# Patient Record
Sex: MALE | Race: WHITE | Employment: OTHER | URBAN - METROPOLITAN AREA
[De-identification: names, ages, dates, MRNs, and addresses within clinical notes are randomized per-mention and may not be internally consistent; named-entity substitution may affect disease eponyms.]

---

## 2017-09-12 PROBLEM — R53.81 MALAISE AND FATIGUE: Status: ACTIVE | Noted: 2017-09-12

## 2017-09-12 PROBLEM — R19.7 DIARRHEA, UNSPECIFIED TYPE: Status: ACTIVE | Noted: 2017-09-12

## 2017-09-12 PROBLEM — R53.83 MALAISE AND FATIGUE: Status: ACTIVE | Noted: 2017-09-12

## 2017-09-12 PROBLEM — R03.0 ELEVATED BLOOD PRESSURE READING IN OFFICE WITHOUT DIAGNOSIS OF HYPERTENSION: Status: ACTIVE | Noted: 2017-09-12

## 2018-01-11 PROBLEM — R53.81 MALAISE AND FATIGUE: Status: RESOLVED | Noted: 2017-09-12 | Resolved: 2018-01-11

## 2018-01-11 PROBLEM — R19.7 DIARRHEA, UNSPECIFIED TYPE: Status: RESOLVED | Noted: 2017-09-12 | Resolved: 2018-01-11

## 2018-01-11 PROBLEM — F41.9 ANXIETY: Status: ACTIVE | Noted: 2018-01-11

## 2018-01-11 PROBLEM — R53.83 MALAISE AND FATIGUE: Status: RESOLVED | Noted: 2017-09-12 | Resolved: 2018-01-11

## 2018-01-11 PROBLEM — R03.0 ELEVATED BLOOD PRESSURE READING IN OFFICE WITHOUT DIAGNOSIS OF HYPERTENSION: Status: RESOLVED | Noted: 2017-09-12 | Resolved: 2018-01-11

## 2018-01-11 PROBLEM — E78.00 PURE HYPERCHOLESTEROLEMIA: Status: ACTIVE | Noted: 2018-01-11

## 2018-01-11 PROBLEM — M10.9 ACUTE GOUT OF FOOT, UNSPECIFIED CAUSE, UNSPECIFIED LATERALITY: Status: ACTIVE | Noted: 2018-01-11

## 2018-01-11 PROBLEM — G47.00 INSOMNIA, UNSPECIFIED TYPE: Status: ACTIVE | Noted: 2018-01-11

## 2018-07-12 PROCEDURE — 88305 TISSUE EXAM BY PATHOLOGIST: CPT | Performed by: SPECIALIST

## 2022-04-07 PROBLEM — I10 ESSENTIAL HYPERTENSION: Status: ACTIVE | Noted: 2022-04-07

## 2022-04-07 PROBLEM — E78.00 PURE HYPERCHOLESTEROLEMIA: Status: RESOLVED | Noted: 2018-01-11 | Resolved: 2022-04-07

## 2022-04-07 PROBLEM — R73.01 IMPAIRED FASTING GLUCOSE: Status: ACTIVE | Noted: 2022-04-07

## 2022-04-07 PROBLEM — E78.2 HYPERLIPEMIA, MIXED: Status: ACTIVE | Noted: 2022-04-07

## 2022-04-07 PROBLEM — E66.9 OBESITY (BMI 30.0-34.9): Status: ACTIVE | Noted: 2022-04-07

## 2023-05-04 RX ORDER — CLINDAMYCIN PHOSPHATE 900 MG/50ML
900 INJECTION INTRAVENOUS ONCE
Status: CANCELLED | OUTPATIENT
Start: 2023-05-23 | End: 2023-05-04

## 2023-05-22 ENCOUNTER — ANESTHESIA EVENT (OUTPATIENT)
Dept: SURGERY | Facility: HOSPITAL | Age: 73
End: 2023-05-22
Payer: MEDICARE

## 2023-05-23 ENCOUNTER — APPOINTMENT (OUTPATIENT)
Dept: GENERAL RADIOLOGY | Facility: HOSPITAL | Age: 73
End: 2023-05-23
Attending: UROLOGY
Payer: MEDICARE

## 2023-05-23 ENCOUNTER — HOSPITAL ENCOUNTER (OUTPATIENT)
Facility: HOSPITAL | Age: 73
Discharge: HOME | End: 2023-05-24
Attending: UROLOGY | Admitting: UROLOGY
Payer: MEDICARE

## 2023-05-23 ENCOUNTER — ANESTHESIA (OUTPATIENT)
Dept: SURGERY | Facility: HOSPITAL | Age: 73
End: 2023-05-23
Payer: MEDICARE

## 2023-05-23 PROBLEM — N13.8 BPH WITH OBSTRUCTION/LOWER URINARY TRACT SYMPTOMS: Status: ACTIVE | Noted: 2023-05-23

## 2023-05-23 PROBLEM — N40.1 BPH WITH OBSTRUCTION/LOWER URINARY TRACT SYMPTOMS: Status: ACTIVE | Noted: 2023-05-23

## 2023-05-23 LAB
ANION GAP SERPL CALC-SCNC: 3 MMOL/L (ref 0–18)
BUN BLD-MCNC: 18 MG/DL (ref 7–18)
CALCIUM BLD-MCNC: 8.3 MG/DL (ref 8.5–10.1)
CHLORIDE SERPL-SCNC: 108 MMOL/L (ref 98–112)
CO2 SERPL-SCNC: 27 MMOL/L (ref 21–32)
CREAT BLD-MCNC: 1.47 MG/DL
ERYTHROCYTE [DISTWIDTH] IN BLOOD BY AUTOMATED COUNT: 12.4 %
GFR SERPLBLD BASED ON 1.73 SQ M-ARVRAT: 51 ML/MIN/1.73M2 (ref 60–?)
GLUCOSE BLD-MCNC: 143 MG/DL (ref 70–99)
HCT VFR BLD AUTO: 41.8 %
HGB BLD-MCNC: 14.3 G/DL
MCH RBC QN AUTO: 29.4 PG (ref 26–34)
MCHC RBC AUTO-ENTMCNC: 34.2 G/DL (ref 31–37)
MCV RBC AUTO: 85.8 FL
OSMOLALITY SERPL CALC.SUM OF ELEC: 290 MOSM/KG (ref 275–295)
PLATELET # BLD AUTO: 162 10(3)UL (ref 150–450)
POTASSIUM SERPL-SCNC: 3.9 MMOL/L (ref 3.5–5.1)
RBC # BLD AUTO: 4.87 X10(6)UL
SODIUM SERPL-SCNC: 138 MMOL/L (ref 136–145)
WBC # BLD AUTO: 6.4 X10(3) UL (ref 4–11)

## 2023-05-23 PROCEDURE — 80048 BASIC METABOLIC PNL TOTAL CA: CPT | Performed by: UROLOGY

## 2023-05-23 PROCEDURE — 88305 TISSUE EXAM BY PATHOLOGIST: CPT | Performed by: UROLOGY

## 2023-05-23 PROCEDURE — 88300 SURGICAL PATH GROSS: CPT | Performed by: UROLOGY

## 2023-05-23 PROCEDURE — 82365 CALCULUS SPECTROSCOPY: CPT | Performed by: UROLOGY

## 2023-05-23 PROCEDURE — 0V507ZZ DESTRUCTION OF PROSTATE, VIA NATURAL OR ARTIFICIAL OPENING: ICD-10-PCS | Performed by: UROLOGY

## 2023-05-23 PROCEDURE — 0TCB8ZZ EXTIRPATION OF MATTER FROM BLADDER, VIA NATURAL OR ARTIFICIAL OPENING ENDOSCOPIC: ICD-10-PCS | Performed by: UROLOGY

## 2023-05-23 PROCEDURE — 85027 COMPLETE CBC AUTOMATED: CPT | Performed by: UROLOGY

## 2023-05-23 RX ORDER — CEFAZOLIN SODIUM/WATER 2 G/20 ML
2 SYRINGE (ML) INTRAVENOUS ONCE
Status: COMPLETED | OUTPATIENT
Start: 2023-05-23 | End: 2023-05-23

## 2023-05-23 RX ORDER — HEPARIN SODIUM 5000 [USP'U]/ML
5000 INJECTION, SOLUTION INTRAVENOUS; SUBCUTANEOUS EVERY 8 HOURS SCHEDULED
Status: DISCONTINUED | OUTPATIENT
Start: 2023-05-23 | End: 2023-05-24

## 2023-05-23 RX ORDER — OXYCODONE HYDROCHLORIDE 5 MG/1
2.5 TABLET ORAL EVERY 4 HOURS PRN
Status: DISCONTINUED | OUTPATIENT
Start: 2023-05-23 | End: 2023-05-24

## 2023-05-23 RX ORDER — SENNOSIDES 8.6 MG
17.2 TABLET ORAL NIGHTLY PRN
Status: DISCONTINUED | OUTPATIENT
Start: 2023-05-23 | End: 2023-05-24

## 2023-05-23 RX ORDER — SODIUM CHLORIDE, SODIUM LACTATE, POTASSIUM CHLORIDE, CALCIUM CHLORIDE 600; 310; 30; 20 MG/100ML; MG/100ML; MG/100ML; MG/100ML
INJECTION, SOLUTION INTRAVENOUS CONTINUOUS
Status: DISCONTINUED | OUTPATIENT
Start: 2023-05-23 | End: 2023-05-23

## 2023-05-23 RX ORDER — NALOXONE HYDROCHLORIDE 0.4 MG/ML
80 INJECTION, SOLUTION INTRAMUSCULAR; INTRAVENOUS; SUBCUTANEOUS AS NEEDED
Status: DISCONTINUED | OUTPATIENT
Start: 2023-05-23 | End: 2023-05-23 | Stop reason: HOSPADM

## 2023-05-23 RX ORDER — HYDROMORPHONE HYDROCHLORIDE 1 MG/ML
0.3 INJECTION, SOLUTION INTRAMUSCULAR; INTRAVENOUS; SUBCUTANEOUS EVERY 5 MIN PRN
Status: DISCONTINUED | OUTPATIENT
Start: 2023-05-23 | End: 2023-05-23 | Stop reason: HOSPADM

## 2023-05-23 RX ORDER — DEXAMETHASONE SODIUM PHOSPHATE 4 MG/ML
VIAL (ML) INJECTION AS NEEDED
Status: DISCONTINUED | OUTPATIENT
Start: 2023-05-23 | End: 2023-05-23 | Stop reason: SURG

## 2023-05-23 RX ORDER — LOSARTAN POTASSIUM 100 MG/1
100 TABLET ORAL NIGHTLY
Status: DISCONTINUED | OUTPATIENT
Start: 2023-05-23 | End: 2023-05-24

## 2023-05-23 RX ORDER — ONDANSETRON 2 MG/ML
INJECTION INTRAMUSCULAR; INTRAVENOUS AS NEEDED
Status: DISCONTINUED | OUTPATIENT
Start: 2023-05-23 | End: 2023-05-23 | Stop reason: SURG

## 2023-05-23 RX ORDER — HYDROMORPHONE HYDROCHLORIDE 1 MG/ML
0.4 INJECTION, SOLUTION INTRAMUSCULAR; INTRAVENOUS; SUBCUTANEOUS EVERY 5 MIN PRN
Status: DISCONTINUED | OUTPATIENT
Start: 2023-05-23 | End: 2023-05-23 | Stop reason: HOSPADM

## 2023-05-23 RX ORDER — PHENYLEPHRINE HCL 10 MG/ML
VIAL (ML) INJECTION AS NEEDED
Status: DISCONTINUED | OUTPATIENT
Start: 2023-05-23 | End: 2023-05-23 | Stop reason: SURG

## 2023-05-23 RX ORDER — HYDROCODONE BITARTRATE AND ACETAMINOPHEN 5; 325 MG/1; MG/1
1 TABLET ORAL ONCE AS NEEDED
Status: DISCONTINUED | OUTPATIENT
Start: 2023-05-23 | End: 2023-05-23 | Stop reason: HOSPADM

## 2023-05-23 RX ORDER — HYDROMORPHONE HYDROCHLORIDE 1 MG/ML
0.2 INJECTION, SOLUTION INTRAMUSCULAR; INTRAVENOUS; SUBCUTANEOUS EVERY 5 MIN PRN
Status: DISCONTINUED | OUTPATIENT
Start: 2023-05-23 | End: 2023-05-23 | Stop reason: HOSPADM

## 2023-05-23 RX ORDER — OXYBUTYNIN CHLORIDE 5 MG/1
5 TABLET ORAL EVERY 6 HOURS PRN
Status: DISCONTINUED | OUTPATIENT
Start: 2023-05-23 | End: 2023-05-24

## 2023-05-23 RX ORDER — ONDANSETRON 2 MG/ML
4 INJECTION INTRAMUSCULAR; INTRAVENOUS EVERY 6 HOURS PRN
Status: DISCONTINUED | OUTPATIENT
Start: 2023-05-23 | End: 2023-05-24

## 2023-05-23 RX ORDER — LABETALOL HYDROCHLORIDE 5 MG/ML
5 INJECTION, SOLUTION INTRAVENOUS EVERY 5 MIN PRN
Status: DISCONTINUED | OUTPATIENT
Start: 2023-05-23 | End: 2023-05-23 | Stop reason: HOSPADM

## 2023-05-23 RX ORDER — ACETAMINOPHEN 500 MG
1000 TABLET ORAL ONCE AS NEEDED
Status: DISCONTINUED | OUTPATIENT
Start: 2023-05-23 | End: 2023-05-23 | Stop reason: HOSPADM

## 2023-05-23 RX ORDER — ONDANSETRON 2 MG/ML
4 INJECTION INTRAMUSCULAR; INTRAVENOUS EVERY 6 HOURS PRN
Status: DISCONTINUED | OUTPATIENT
Start: 2023-05-23 | End: 2023-05-23 | Stop reason: HOSPADM

## 2023-05-23 RX ORDER — SODIUM CHLORIDE, SODIUM LACTATE, POTASSIUM CHLORIDE, CALCIUM CHLORIDE 600; 310; 30; 20 MG/100ML; MG/100ML; MG/100ML; MG/100ML
INJECTION, SOLUTION INTRAVENOUS CONTINUOUS
Status: DISCONTINUED | OUTPATIENT
Start: 2023-05-23 | End: 2023-05-23 | Stop reason: HOSPADM

## 2023-05-23 RX ORDER — KETOROLAC TROMETHAMINE 30 MG/ML
INJECTION, SOLUTION INTRAMUSCULAR; INTRAVENOUS AS NEEDED
Status: DISCONTINUED | OUTPATIENT
Start: 2023-05-23 | End: 2023-05-23 | Stop reason: SURG

## 2023-05-23 RX ORDER — LOSARTAN POTASSIUM 100 MG/1
100 TABLET ORAL DAILY
Status: DISCONTINUED | OUTPATIENT
Start: 2023-05-23 | End: 2023-05-23

## 2023-05-23 RX ORDER — OXYCODONE HYDROCHLORIDE 5 MG/1
5 TABLET ORAL EVERY 4 HOURS PRN
Status: DISCONTINUED | OUTPATIENT
Start: 2023-05-23 | End: 2023-05-24

## 2023-05-23 RX ORDER — HYDROCODONE BITARTRATE AND ACETAMINOPHEN 5; 325 MG/1; MG/1
2 TABLET ORAL ONCE AS NEEDED
Status: DISCONTINUED | OUTPATIENT
Start: 2023-05-23 | End: 2023-05-23 | Stop reason: HOSPADM

## 2023-05-23 RX ORDER — METOCLOPRAMIDE HYDROCHLORIDE 5 MG/ML
10 INJECTION INTRAMUSCULAR; INTRAVENOUS EVERY 8 HOURS PRN
Status: DISCONTINUED | OUTPATIENT
Start: 2023-05-23 | End: 2023-05-23 | Stop reason: HOSPADM

## 2023-05-23 RX ORDER — POLYETHYLENE GLYCOL 3350 17 G/17G
17 POWDER, FOR SOLUTION ORAL DAILY PRN
Status: DISCONTINUED | OUTPATIENT
Start: 2023-05-23 | End: 2023-05-24

## 2023-05-23 RX ORDER — HYDROMORPHONE HYDROCHLORIDE 1 MG/ML
INJECTION, SOLUTION INTRAMUSCULAR; INTRAVENOUS; SUBCUTANEOUS
Status: COMPLETED
Start: 2023-05-23 | End: 2023-05-23

## 2023-05-23 RX ORDER — HYDROMORPHONE HYDROCHLORIDE 1 MG/ML
0.2 INJECTION, SOLUTION INTRAMUSCULAR; INTRAVENOUS; SUBCUTANEOUS EVERY 2 HOUR PRN
Status: DISCONTINUED | OUTPATIENT
Start: 2023-05-23 | End: 2023-05-24

## 2023-05-23 RX ORDER — MELATONIN
3 NIGHTLY PRN
Status: DISCONTINUED | OUTPATIENT
Start: 2023-05-23 | End: 2023-05-24

## 2023-05-23 RX ORDER — LIDOCAINE HYDROCHLORIDE 40 MG/ML
SOLUTION TOPICAL AS NEEDED
Status: DISCONTINUED | OUTPATIENT
Start: 2023-05-23 | End: 2023-05-23 | Stop reason: SURG

## 2023-05-23 RX ORDER — SODIUM CHLORIDE 9 MG/ML
INJECTION, SOLUTION INTRAVENOUS CONTINUOUS
Status: DISCONTINUED | OUTPATIENT
Start: 2023-05-23 | End: 2023-05-24

## 2023-05-23 RX ORDER — ONDANSETRON 4 MG/1
4 TABLET, ORALLY DISINTEGRATING ORAL EVERY 6 HOURS PRN
Status: DISCONTINUED | OUTPATIENT
Start: 2023-05-23 | End: 2023-05-24

## 2023-05-23 RX ORDER — LIDOCAINE HYDROCHLORIDE 10 MG/ML
INJECTION, SOLUTION EPIDURAL; INFILTRATION; INTRACAUDAL; PERINEURAL AS NEEDED
Status: DISCONTINUED | OUTPATIENT
Start: 2023-05-23 | End: 2023-05-23 | Stop reason: SURG

## 2023-05-23 RX ORDER — HYDROMORPHONE HYDROCHLORIDE 1 MG/ML
0.4 INJECTION, SOLUTION INTRAMUSCULAR; INTRAVENOUS; SUBCUTANEOUS EVERY 2 HOUR PRN
Status: DISCONTINUED | OUTPATIENT
Start: 2023-05-23 | End: 2023-05-24

## 2023-05-23 RX ORDER — EPHEDRINE SULFATE 50 MG/ML
INJECTION INTRAVENOUS AS NEEDED
Status: DISCONTINUED | OUTPATIENT
Start: 2023-05-23 | End: 2023-05-23 | Stop reason: SURG

## 2023-05-23 RX ORDER — ACETAMINOPHEN 500 MG
1000 TABLET ORAL ONCE
Status: DISCONTINUED | OUTPATIENT
Start: 2023-05-23 | End: 2023-05-23 | Stop reason: HOSPADM

## 2023-05-23 RX ORDER — ROSUVASTATIN CALCIUM 10 MG/1
10 TABLET, COATED ORAL NIGHTLY
Status: DISCONTINUED | OUTPATIENT
Start: 2023-05-23 | End: 2023-05-24

## 2023-05-23 RX ORDER — MIDAZOLAM HYDROCHLORIDE 1 MG/ML
INJECTION INTRAMUSCULAR; INTRAVENOUS AS NEEDED
Status: DISCONTINUED | OUTPATIENT
Start: 2023-05-23 | End: 2023-05-23 | Stop reason: SURG

## 2023-05-23 RX ORDER — ROCURONIUM BROMIDE 10 MG/ML
INJECTION, SOLUTION INTRAVENOUS AS NEEDED
Status: DISCONTINUED | OUTPATIENT
Start: 2023-05-23 | End: 2023-05-23 | Stop reason: SURG

## 2023-05-23 RX ORDER — METOCLOPRAMIDE HYDROCHLORIDE 5 MG/ML
INJECTION INTRAMUSCULAR; INTRAVENOUS AS NEEDED
Status: DISCONTINUED | OUTPATIENT
Start: 2023-05-23 | End: 2023-05-23 | Stop reason: SURG

## 2023-05-23 RX ADMIN — CEFAZOLIN SODIUM/WATER 2 G: 2 G/20 ML SYRINGE (ML) INTRAVENOUS at 08:17:00

## 2023-05-23 RX ADMIN — MIDAZOLAM HYDROCHLORIDE 2 MG: 1 INJECTION INTRAMUSCULAR; INTRAVENOUS at 08:08:00

## 2023-05-23 RX ADMIN — LIDOCAINE HYDROCHLORIDE 50 MG: 10 INJECTION, SOLUTION EPIDURAL; INFILTRATION; INTRACAUDAL; PERINEURAL at 08:11:00

## 2023-05-23 RX ADMIN — DEXAMETHASONE SODIUM PHOSPHATE 4 MG: 4 MG/ML VIAL (ML) INJECTION at 08:08:00

## 2023-05-23 RX ADMIN — EPHEDRINE SULFATE 10 MG: 50 INJECTION INTRAVENOUS at 08:44:00

## 2023-05-23 RX ADMIN — SODIUM CHLORIDE, SODIUM LACTATE, POTASSIUM CHLORIDE, CALCIUM CHLORIDE: 600; 310; 30; 20 INJECTION, SOLUTION INTRAVENOUS at 10:02:00

## 2023-05-23 RX ADMIN — PHENYLEPHRINE HCL 200 MCG: 10 MG/ML VIAL (ML) INJECTION at 08:28:00

## 2023-05-23 RX ADMIN — LIDOCAINE HYDROCHLORIDE 4 ML: 40 SOLUTION TOPICAL at 08:14:00

## 2023-05-23 RX ADMIN — PHENYLEPHRINE HCL 100 MCG: 10 MG/ML VIAL (ML) INJECTION at 08:43:00

## 2023-05-23 RX ADMIN — PHENYLEPHRINE HCL 200 MCG: 10 MG/ML VIAL (ML) INJECTION at 08:33:00

## 2023-05-23 RX ADMIN — ROCURONIUM BROMIDE 20 MG: 10 INJECTION, SOLUTION INTRAVENOUS at 08:56:00

## 2023-05-23 RX ADMIN — PHENYLEPHRINE HCL 100 MCG: 10 MG/ML VIAL (ML) INJECTION at 08:40:00

## 2023-05-23 RX ADMIN — ROCURONIUM BROMIDE 10 MG: 10 INJECTION, SOLUTION INTRAVENOUS at 09:26:00

## 2023-05-23 RX ADMIN — METOCLOPRAMIDE HYDROCHLORIDE 10 MG: 5 INJECTION INTRAMUSCULAR; INTRAVENOUS at 08:08:00

## 2023-05-23 RX ADMIN — PHENYLEPHRINE HCL 200 MCG: 10 MG/ML VIAL (ML) INJECTION at 08:25:00

## 2023-05-23 RX ADMIN — KETOROLAC TROMETHAMINE 30 MG: 30 INJECTION, SOLUTION INTRAMUSCULAR; INTRAVENOUS at 10:02:00

## 2023-05-23 RX ADMIN — SODIUM CHLORIDE, SODIUM LACTATE, POTASSIUM CHLORIDE, CALCIUM CHLORIDE: 600; 310; 30; 20 INJECTION, SOLUTION INTRAVENOUS at 08:08:00

## 2023-05-23 RX ADMIN — ONDANSETRON 4 MG: 2 INJECTION INTRAMUSCULAR; INTRAVENOUS at 10:02:00

## 2023-05-23 RX ADMIN — ROCURONIUM BROMIDE 50 MG: 10 INJECTION, SOLUTION INTRAVENOUS at 08:11:00

## 2023-05-23 NOTE — ANESTHESIA POSTPROCEDURE EVALUATION
[facility]    Prabhjot Barron Patient Status:  Outpatient in a Bed   Age/Gender 81year old male MRN [id number]   Location [address] [facility] Attending Sandra Natarajan MD   [address] Day # 0 PCP Cristhian Javier MD       Anesthesia Post-op Note    CYSTOSCOPY, CYSTOLITHOLAPAXY, TRANSURETHRAL RESECTION PROSTATE    Procedure Summary     Date: 05/23/23 Room / Location: [address] [place] [address] / [address] [place] [address] OR    Anesthesia Start: 3232 Anesthesia Stop: 0774    Procedure: CYSTOSCOPY, CYSTOLITHOLAPAXY, TRANSURETHRAL RESECTION PROSTATE (Urethra) Diagnosis: (BENIGHN PROSTATIC HYPERPLASIA W/OBSTRUCTION, LOWER URINARY TRACT SYNDROME, BLADDER CALCULI)    Surgeons: Sandra Natarajan MD Anesthesiologist: Krzysztof Dangelo MD    Anesthesia Type: general ASA Status: 2          Anesthesia Type: general    Vitals Value Taken Time   /87 05/23/23 1026   Temp 97.4 05/23/23 1026   Pulse 75 05/23/23 1026   Resp 14 05/23/23 1026   SpO2 98% 05/23/23 1026       Patient Location: PACU    Anesthesia Type: general    Airway Patency: patent and extubated    Postop Pain Control: adequate    Mental Status: mildly sedated but able to meaningfully participate in the post-anesthesia evaluation    Nausea/Vomiting: none    Cardiopulmonary/Hydration status: stable euvolemic    Complications: no apparent anesthesia related complications    Postop vital signs: stable    Dental Exam: Unchanged from Preop    Patient to be discharged from PACU when criteria met.

## 2023-05-23 NOTE — PROGRESS NOTES
NURSING ADMISSION NOTE      Patient admitted via Cart  Oriented to room. Safety precautions initiated. Bed in low position. Call light in reach. Admission navigator completed. VSS. Pt resting in bed. Pt rates pain to penis has a 7 on a 1-10 scale. Oxycodone given. cbi infusing at moderate rate, light pink in color. Pt and wife orientated to unit and updated on poc. Report give to Zulma/ALYCIA Muniz.

## 2023-05-23 NOTE — PLAN OF CARE
Patient is alert and oriented X4. VSS. Ra. Regular diet tolerating well. Up ad benita. Joe with CBI. Pain medication given per MAR. Patient states no nausea or sob. Abdomen is soft and tender. Bowel sounds are hyperactive. Lung sounds are clear. Safety precautions in place. Call light in reach.

## 2023-05-23 NOTE — ANESTHESIA PROCEDURE NOTES
Airway  Date/Time: 5/23/2023 8:14 AM  Urgency: elective    Airway not difficult    General Information and Staff    Patient location during procedure: OR  Anesthesiologist: Damon Duane, MD  Performed: anesthesiologist   Performed by: Damon Duane, MD  Authorized by: Damon Duane, MD      Indications and Patient Condition  Indications for airway management: anesthesia  Sedation level: deep  Preoxygenated: yes  Patient position: sniffing  Mask difficulty assessment: 1 - vent by mask    Final Airway Details  Final airway type: endotracheal airway      Successful airway: ETT  Cuffed: yes   Successful intubation technique: direct laryngoscopy  Facilitating devices/methods: intubating stylet  Endotracheal tube insertion site: oral  Blade: Leslie  Blade size: #4  ETT size (mm): 8.0    Cormack-Lehane Classification: grade I - full view of glottis  Placement verified by: chest auscultation and capnometry   Measured from: lips  ETT to lips (cm): 23  Number of attempts at approach: 1  Number of other approaches attempted: 0

## 2023-05-23 NOTE — OPERATIVE REPORT
OPERATIVE NOTE    PATIENT NAME: Eliane Paredes  YOB: 1950  DATE OF SERVICE: 5/23/2023    SURGEON:  Lacey Kimball MD    ASSISTANT:  None    PREOPERATIVE DIAGNOSIS:  Bladder calculi (>2cm), BPH with LUTS    POSTOPERATIVE DIAGNOSIS:  Same    PROCEDURE PERFORMED:  1. Cystoscopy  2. Transurethral resection of the prostate (TURP)  3. Cystolitholapaxy of bladder calculi >2cm    ASSISTANTS:  None    ANESTHESIA:  General    ANTIBIOTIC PROPHYLAXIS:  Ancef    SPECIMENS:  1. Bladder calculi  2. Prostate Tissue    DRAINS:  1. 22F 3 way hematuria catheter (40mL in balloon)    ESTIMATED BLOOD LOSS:  Minimal    COMPLICATIONS:  No immediate complications     INDICATIONS FOR PROCEDURE:  Mr. Misael Matthewsred is a gentleman who was found to have several bladder calculi, as well as BPH with an intravesical median lobe. He has baseline LUTS and elected to proceed with a cystolitholapaxy and TURP. We discussed the procedure in detail, including the potential risks of the procedure. Please see my pre-operative and office notes for procedural counseling details. We discussed the risks of the procedure including bleeding, infection, damage to the urologic structures, incontinence, bladder neck contracture, erectile dysfunction, or failure of the procedure to alleviate his bothersome LUTS. He understands that most men have irritative LUTS for up to 1-2 months post-operatively. DESCRIPTION OF PROCEDURE:  After reviewing the indications for the procedure, informed consent was reviewed and signed by the patient. The patient was brought to the operating room and placed in the supine position on the OR table. SCD's were applied and all pressure points were carefully padded. At this point, anesthesia was successfully induced. The patient was then given the perioperative antibiotics listed above prior to the procedure.   The patient was transferred to the dorsal lithotomy position and prepped and draped in the normal sterile fashion using betadine. We then performed an operative time out to confirm the correct patient, procedure, and laterality. Everyone in the room was in agreement. I began by inserting a 22F rigid cystoscope into the bladder per urethra. The urethra was without lesions or strictures. The prostate showed significant lateral lobe hyperplasia, as well as an intravesical median lobe. Upon entering the bladder I performed a thorough cystoscopy which revealed several bladder calculi. There was a larger, ~1.5cm calculus, as well as 3-4 0.5cm calculi in total.  I then inserted 1000 micron laser fiber and used this to fragment the stones into smaller fragments. Once the stones had been adequately fragmented, I then evacuated all stone debris through the cystoscope sheath and this was sent to pathology for chemical analysis. At this point, I withdrew the cystoscope and inserted a 26F continuous flow resectoscope into the bladder using a visual obturator. I then inserted a bipolar electrocautery loop and used this to first resect his median lobe tissue until this was flush with the posterior wall of the bladder. I then incised the bladder neck at 5 and 7 o'clock and then began resecting the lateral lobe tissue, from the level of the bladder neck, back to the veru montanum. Care was taken to avoid resection distal to the veru to prevent any inadvertent injury to the external urethral sphincter. I resected bilaterally to the level of the prostatic capsule. I then evacuated all specimen and this was sent to pathology as \"prostate tissue. \"  I then carefully obtained hemostasis with the bladder minimally distended with irrigant. Once I was satisfied with the degree of hemostasis, I inspected again to ensure evacuation of all stone fragments and prostate tissue. I visualized the ureteral orifices in their orthotopic locations.  They were unharmed and I noted efflux of clear urine bilaterally at the completion of the procedure. Finally, I withdrew the resectoscope and then placed a 22F 3 way hematuria catheter. The balloon was inflated with 40mL of sterile water. The catheter was placed on gentle traction on the right inner thigh and continuous bladder irritation was initiated. His urine was rather clear on a wide open drip. This completed the procedure. They tolerated it well without complications. Please note that I was present for, and completed the entirety of this operation myself. PLAN:  Continue CBI overnight. Wean CBI to keep urine a light pink coloration. Maintain CBI overnight. We will plan to stop CBI tomorrow AM and remove his gates if his urine remains clear.   Check labs in PACU, as well as tomorrow AM.       Ugo Meyer MD  [place] [address] [facility]  Office: [phone]

## 2023-05-24 VITALS
RESPIRATION RATE: 18 BRPM | HEART RATE: 87 BPM | OXYGEN SATURATION: 97 % | SYSTOLIC BLOOD PRESSURE: 162 MMHG | HEIGHT: 72 IN | DIASTOLIC BLOOD PRESSURE: 79 MMHG | TEMPERATURE: 98 F | WEIGHT: 223 LBS | BODY MASS INDEX: 30.2 KG/M2

## 2023-05-24 LAB
ANION GAP SERPL CALC-SCNC: 5 MMOL/L (ref 0–18)
BUN BLD-MCNC: 20 MG/DL (ref 7–18)
CALCIUM BLD-MCNC: 8.1 MG/DL (ref 8.5–10.1)
CHLORIDE SERPL-SCNC: 109 MMOL/L (ref 98–112)
CO2 SERPL-SCNC: 26 MMOL/L (ref 21–32)
CREAT BLD-MCNC: 1.42 MG/DL
GFR SERPLBLD BASED ON 1.73 SQ M-ARVRAT: 53 ML/MIN/1.73M2 (ref 60–?)
GLUCOSE BLD-MCNC: 118 MG/DL (ref 70–99)
HCT VFR BLD AUTO: 38.1 %
HGB BLD-MCNC: 13 G/DL
HGB BLD-MCNC: 13.2 G/DL
OSMOLALITY SERPL CALC.SUM OF ELEC: 294 MOSM/KG (ref 275–295)
POTASSIUM SERPL-SCNC: 3.9 MMOL/L (ref 3.5–5.1)
SODIUM SERPL-SCNC: 140 MMOL/L (ref 136–145)

## 2023-05-24 PROCEDURE — 80048 BASIC METABOLIC PNL TOTAL CA: CPT | Performed by: UROLOGY

## 2023-05-24 PROCEDURE — 85014 HEMATOCRIT: CPT | Performed by: UROLOGY

## 2023-05-24 PROCEDURE — 85018 HEMOGLOBIN: CPT | Performed by: UROLOGY

## 2023-05-24 PROCEDURE — 85018 HEMOGLOBIN: CPT | Performed by: HOSPITALIST

## 2023-05-24 RX ORDER — QUETIAPINE FUMARATE 25 MG/1
25 TABLET, FILM COATED ORAL NIGHTLY
Status: DISCONTINUED | OUTPATIENT
Start: 2023-05-24 | End: 2023-05-24

## 2023-05-24 RX ORDER — OXYBUTYNIN CHLORIDE 5 MG/1
5 TABLET ORAL 3 TIMES DAILY PRN
Qty: 15 TABLET | Refills: 0 | Status: SHIPPED | OUTPATIENT
Start: 2023-05-24

## 2023-05-24 RX ORDER — TRAMADOL HYDROCHLORIDE 50 MG/1
TABLET ORAL EVERY 4 HOURS PRN
Qty: 12 TABLET | Refills: 0 | Status: SHIPPED | OUTPATIENT
Start: 2023-05-24

## 2023-05-24 RX ORDER — LORAZEPAM 0.5 MG/1
0.5 TABLET ORAL 2 TIMES DAILY PRN
Qty: 6 TABLET | Refills: 0 | Status: SHIPPED | OUTPATIENT
Start: 2023-05-24

## 2023-05-24 RX ORDER — SODIUM CHLORIDE 9 MG/ML
INJECTION, SOLUTION INTRAVENOUS CONTINUOUS
Status: DISCONTINUED | OUTPATIENT
Start: 2023-05-24 | End: 2023-05-24

## 2023-05-24 NOTE — PROGRESS NOTES
NURSING DISCHARGE NOTE    Discharged Home via Wheelchair. Accompanied by Spouse  Belongings Taken by patient/family. Joe care demonstration taught and returned. All questions answered.

## 2023-05-24 NOTE — PLAN OF CARE
A&Ox4. VSS. RA. . Denies chest pain and SOB. GI: Abdomen soft, nondistended. Not Passing gas. Denies nausea. : Joe in place. Bacitracin given to pt to apply to tip of penis as needed for pain     Pain controlled with PRN pain medications. Resting in bed   Drains: CBI running at slow rate, pink tinged urine   Diet: Regular diet  IVF running per order. All appropriate safety measures in place. All questions and concerns addressed.

## 2023-05-24 NOTE — PLAN OF CARE
Patient alert and oriented X 4. VSS. RA. Patient denies pain and nausea. Abdomen is soft and non distended. Patient passing gas. Joe in place. Regular diet tolerating well. Updated on plan of care. Call light in reach.

## 2023-05-31 LAB
URIC ACID-STONE: 100 %
WEIGHT-STONE: 1945 MG

## 2023-06-29 RX ORDER — CLINDAMYCIN PHOSPHATE 900 MG/50ML
900 INJECTION INTRAVENOUS ONCE
Status: DISCONTINUED | OUTPATIENT
Start: 2023-06-29 | End: 2023-06-29

## 2023-07-10 ENCOUNTER — ANESTHESIA EVENT (OUTPATIENT)
Dept: SURGERY | Facility: HOSPITAL | Age: 73
End: 2023-07-10
Payer: MEDICARE

## 2023-07-12 ENCOUNTER — ANESTHESIA (OUTPATIENT)
Dept: SURGERY | Facility: HOSPITAL | Age: 73
End: 2023-07-12
Payer: MEDICARE

## 2023-07-12 ENCOUNTER — HOSPITAL ENCOUNTER (INPATIENT)
Facility: HOSPITAL | Age: 73
LOS: 2 days | Discharge: HOME | End: 2023-07-14
Attending: UROLOGY | Admitting: UROLOGY
Payer: MEDICARE

## 2023-07-12 PROBLEM — N28.89 LEFT RENAL MASS: Status: ACTIVE | Noted: 2023-07-12

## 2023-07-12 LAB
ANION GAP SERPL CALC-SCNC: 5 MMOL/L (ref 0–18)
ANTIBODY SCREEN: NEGATIVE
BUN BLD-MCNC: 20 MG/DL (ref 7–18)
CALCIUM BLD-MCNC: 8.3 MG/DL (ref 8.5–10.1)
CHLORIDE SERPL-SCNC: 111 MMOL/L (ref 98–112)
CO2 SERPL-SCNC: 21 MMOL/L (ref 21–32)
CREAT BLD-MCNC: 1.72 MG/DL
ERYTHROCYTE [DISTWIDTH] IN BLOOD BY AUTOMATED COUNT: 12.9 %
GFR SERPLBLD BASED ON 1.73 SQ M-ARVRAT: 42 ML/MIN/1.73M2 (ref 60–?)
GLUCOSE BLD-MCNC: 160 MG/DL (ref 70–99)
HCT VFR BLD AUTO: 41.4 %
HGB BLD-MCNC: 13.7 G/DL
MCH RBC QN AUTO: 29.1 PG (ref 26–34)
MCHC RBC AUTO-ENTMCNC: 33.1 G/DL (ref 31–37)
MCV RBC AUTO: 88.1 FL
OSMOLALITY SERPL CALC.SUM OF ELEC: 290 MOSM/KG (ref 275–295)
PLATELET # BLD AUTO: 189 10(3)UL (ref 150–450)
POTASSIUM SERPL-SCNC: 4.1 MMOL/L (ref 3.5–5.1)
RBC # BLD AUTO: 4.7 X10(6)UL
RH BLOOD TYPE: NEGATIVE
SODIUM SERPL-SCNC: 137 MMOL/L (ref 136–145)
WBC # BLD AUTO: 9.8 X10(3) UL (ref 4–11)

## 2023-07-12 PROCEDURE — 8E0W4CZ ROBOTIC ASSISTED PROCEDURE OF TRUNK REGION, PERCUTANEOUS ENDOSCOPIC APPROACH: ICD-10-PCS | Performed by: UROLOGY

## 2023-07-12 PROCEDURE — 88307 TISSUE EXAM BY PATHOLOGIST: CPT | Performed by: UROLOGY

## 2023-07-12 PROCEDURE — 80048 BASIC METABOLIC PNL TOTAL CA: CPT | Performed by: UROLOGY

## 2023-07-12 PROCEDURE — 0TB14ZZ EXCISION OF LEFT KIDNEY, PERCUTANEOUS ENDOSCOPIC APPROACH: ICD-10-PCS | Performed by: UROLOGY

## 2023-07-12 PROCEDURE — 86850 RBC ANTIBODY SCREEN: CPT

## 2023-07-12 PROCEDURE — 85027 COMPLETE CBC AUTOMATED: CPT | Performed by: UROLOGY

## 2023-07-12 PROCEDURE — 88341 IMHCHEM/IMCYTCHM EA ADD ANTB: CPT | Performed by: UROLOGY

## 2023-07-12 PROCEDURE — 88304 TISSUE EXAM BY PATHOLOGIST: CPT | Performed by: UROLOGY

## 2023-07-12 PROCEDURE — 86901 BLOOD TYPING SEROLOGIC RH(D): CPT

## 2023-07-12 PROCEDURE — 76942 ECHO GUIDE FOR BIOPSY: CPT | Performed by: ANESTHESIOLOGY

## 2023-07-12 PROCEDURE — 86900 BLOOD TYPING SEROLOGIC ABO: CPT

## 2023-07-12 PROCEDURE — 88342 IMHCHEM/IMCYTCHM 1ST ANTB: CPT | Performed by: UROLOGY

## 2023-07-12 RX ORDER — CEFAZOLIN SODIUM/WATER 2 G/20 ML
2 SYRINGE (ML) INTRAVENOUS ONCE
Status: COMPLETED | OUTPATIENT
Start: 2023-07-12 | End: 2023-07-12

## 2023-07-12 RX ORDER — METOCLOPRAMIDE HYDROCHLORIDE 5 MG/ML
10 INJECTION INTRAMUSCULAR; INTRAVENOUS EVERY 8 HOURS PRN
Status: DISCONTINUED | OUTPATIENT
Start: 2023-07-12 | End: 2023-07-14

## 2023-07-12 RX ORDER — ROCURONIUM BROMIDE 10 MG/ML
INJECTION, SOLUTION INTRAVENOUS AS NEEDED
Status: DISCONTINUED | OUTPATIENT
Start: 2023-07-12 | End: 2023-07-12 | Stop reason: SURG

## 2023-07-12 RX ORDER — GLYCOPYRROLATE 0.2 MG/ML
INJECTION, SOLUTION INTRAMUSCULAR; INTRAVENOUS AS NEEDED
Status: DISCONTINUED | OUTPATIENT
Start: 2023-07-12 | End: 2023-07-12 | Stop reason: SURG

## 2023-07-12 RX ORDER — HYDROMORPHONE HYDROCHLORIDE 1 MG/ML
0.4 INJECTION, SOLUTION INTRAMUSCULAR; INTRAVENOUS; SUBCUTANEOUS EVERY 2 HOUR PRN
Status: DISCONTINUED | OUTPATIENT
Start: 2023-07-12 | End: 2023-07-14

## 2023-07-12 RX ORDER — IBUPROFEN 200 MG
200 TABLET ORAL EVERY 6 HOURS PRN
COMMUNITY
End: 2023-07-14

## 2023-07-12 RX ORDER — HYDROCODONE BITARTRATE AND ACETAMINOPHEN 5; 325 MG/1; MG/1
2 TABLET ORAL ONCE AS NEEDED
Status: DISCONTINUED | OUTPATIENT
Start: 2023-07-12 | End: 2023-07-12 | Stop reason: HOSPADM

## 2023-07-12 RX ORDER — PHENYLEPHRINE HCL 10 MG/ML
VIAL (ML) INJECTION AS NEEDED
Status: DISCONTINUED | OUTPATIENT
Start: 2023-07-12 | End: 2023-07-12 | Stop reason: SURG

## 2023-07-12 RX ORDER — DOCUSATE SODIUM 100 MG/1
100 CAPSULE, LIQUID FILLED ORAL 2 TIMES DAILY
Status: DISCONTINUED | OUTPATIENT
Start: 2023-07-12 | End: 2023-07-14

## 2023-07-12 RX ORDER — SODIUM CHLORIDE, SODIUM LACTATE, POTASSIUM CHLORIDE, CALCIUM CHLORIDE 600; 310; 30; 20 MG/100ML; MG/100ML; MG/100ML; MG/100ML
INJECTION, SOLUTION INTRAVENOUS CONTINUOUS
Status: DISCONTINUED | OUTPATIENT
Start: 2023-07-12 | End: 2023-07-12 | Stop reason: HOSPADM

## 2023-07-12 RX ORDER — DEXTROSE, SODIUM CHLORIDE, SODIUM LACTATE, POTASSIUM CHLORIDE, AND CALCIUM CHLORIDE 5; .6; .31; .03; .02 G/100ML; G/100ML; G/100ML; G/100ML; G/100ML
INJECTION, SOLUTION INTRAVENOUS CONTINUOUS
Status: DISCONTINUED | OUTPATIENT
Start: 2023-07-12 | End: 2023-07-14

## 2023-07-12 RX ORDER — ONDANSETRON 2 MG/ML
4 INJECTION INTRAMUSCULAR; INTRAVENOUS EVERY 6 HOURS PRN
Status: DISCONTINUED | OUTPATIENT
Start: 2023-07-12 | End: 2023-07-12 | Stop reason: HOSPADM

## 2023-07-12 RX ORDER — HYDROMORPHONE HYDROCHLORIDE 1 MG/ML
0.2 INJECTION, SOLUTION INTRAMUSCULAR; INTRAVENOUS; SUBCUTANEOUS EVERY 2 HOUR PRN
Status: DISCONTINUED | OUTPATIENT
Start: 2023-07-12 | End: 2023-07-14

## 2023-07-12 RX ORDER — ACETAMINOPHEN 500 MG
1000 TABLET ORAL ONCE
Status: DISCONTINUED | OUTPATIENT
Start: 2023-07-12 | End: 2023-07-12 | Stop reason: HOSPADM

## 2023-07-12 RX ORDER — DEXAMETHASONE SODIUM PHOSPHATE 10 MG/ML
INJECTION, SOLUTION INTRAMUSCULAR; INTRAVENOUS AS NEEDED
Status: DISCONTINUED | OUTPATIENT
Start: 2023-07-12 | End: 2023-07-12 | Stop reason: SURG

## 2023-07-12 RX ORDER — POLYETHYLENE GLYCOL 3350 17 G/17G
17 POWDER, FOR SOLUTION ORAL DAILY PRN
Status: DISCONTINUED | OUTPATIENT
Start: 2023-07-12 | End: 2023-07-14

## 2023-07-12 RX ORDER — NALOXONE HYDROCHLORIDE 0.4 MG/ML
80 INJECTION, SOLUTION INTRAMUSCULAR; INTRAVENOUS; SUBCUTANEOUS AS NEEDED
Status: DISCONTINUED | OUTPATIENT
Start: 2023-07-12 | End: 2023-07-12 | Stop reason: HOSPADM

## 2023-07-12 RX ORDER — HEPARIN SODIUM 5000 [USP'U]/ML
5000 INJECTION, SOLUTION INTRAVENOUS; SUBCUTANEOUS EVERY 8 HOURS SCHEDULED
Status: DISCONTINUED | OUTPATIENT
Start: 2023-07-13 | End: 2023-07-14

## 2023-07-12 RX ORDER — ERYTHROMYCIN 5 MG/G
1 OINTMENT OPHTHALMIC EVERY 6 HOURS
Status: DISCONTINUED | OUTPATIENT
Start: 2023-07-12 | End: 2023-07-12 | Stop reason: HOSPADM

## 2023-07-12 RX ORDER — SODIUM CHLORIDE 9 MG/ML
INJECTION, SOLUTION INTRAVENOUS CONTINUOUS PRN
Status: DISCONTINUED | OUTPATIENT
Start: 2023-07-12 | End: 2023-07-12 | Stop reason: SURG

## 2023-07-12 RX ORDER — HYDROMORPHONE HYDROCHLORIDE 1 MG/ML
0.4 INJECTION, SOLUTION INTRAMUSCULAR; INTRAVENOUS; SUBCUTANEOUS EVERY 5 MIN PRN
Status: DISCONTINUED | OUTPATIENT
Start: 2023-07-12 | End: 2023-07-12 | Stop reason: HOSPADM

## 2023-07-12 RX ORDER — HYDROMORPHONE HYDROCHLORIDE 1 MG/ML
INJECTION, SOLUTION INTRAMUSCULAR; INTRAVENOUS; SUBCUTANEOUS
Status: COMPLETED
Start: 2023-07-12 | End: 2023-07-12

## 2023-07-12 RX ORDER — HYDROMORPHONE HYDROCHLORIDE 1 MG/ML
0.2 INJECTION, SOLUTION INTRAMUSCULAR; INTRAVENOUS; SUBCUTANEOUS EVERY 5 MIN PRN
Status: DISCONTINUED | OUTPATIENT
Start: 2023-07-12 | End: 2023-07-12 | Stop reason: HOSPADM

## 2023-07-12 RX ORDER — HEPARIN SODIUM 5000 [USP'U]/ML
5000 INJECTION, SOLUTION INTRAVENOUS; SUBCUTANEOUS ONCE
Status: COMPLETED | OUTPATIENT
Start: 2023-07-12 | End: 2023-07-12

## 2023-07-12 RX ORDER — SODIUM CHLORIDE, SODIUM LACTATE, POTASSIUM CHLORIDE, CALCIUM CHLORIDE 600; 310; 30; 20 MG/100ML; MG/100ML; MG/100ML; MG/100ML
INJECTION, SOLUTION INTRAVENOUS CONTINUOUS PRN
Status: DISCONTINUED | OUTPATIENT
Start: 2023-07-12 | End: 2023-07-12 | Stop reason: SURG

## 2023-07-12 RX ORDER — ACETAMINOPHEN 500 MG
1000 TABLET ORAL ONCE AS NEEDED
Status: DISCONTINUED | OUTPATIENT
Start: 2023-07-12 | End: 2023-07-12 | Stop reason: HOSPADM

## 2023-07-12 RX ORDER — BUPIVACAINE HYDROCHLORIDE 2.5 MG/ML
INJECTION, SOLUTION EPIDURAL; INFILTRATION; INTRACAUDAL AS NEEDED
Status: DISCONTINUED | OUTPATIENT
Start: 2023-07-12 | End: 2023-07-12 | Stop reason: HOSPADM

## 2023-07-12 RX ORDER — LOSARTAN POTASSIUM 100 MG/1
100 TABLET ORAL DAILY
Status: DISCONTINUED | OUTPATIENT
Start: 2023-07-12 | End: 2023-07-14

## 2023-07-12 RX ORDER — ONDANSETRON 2 MG/ML
INJECTION INTRAMUSCULAR; INTRAVENOUS AS NEEDED
Status: DISCONTINUED | OUTPATIENT
Start: 2023-07-12 | End: 2023-07-12 | Stop reason: SURG

## 2023-07-12 RX ORDER — BUPIVACAINE HYDROCHLORIDE 2.5 MG/ML
INJECTION, SOLUTION EPIDURAL; INFILTRATION; INTRACAUDAL AS NEEDED
Status: DISCONTINUED | OUTPATIENT
Start: 2023-07-12 | End: 2023-07-12 | Stop reason: SURG

## 2023-07-12 RX ORDER — METOCLOPRAMIDE HYDROCHLORIDE 5 MG/ML
10 INJECTION INTRAMUSCULAR; INTRAVENOUS EVERY 8 HOURS PRN
Status: DISCONTINUED | OUTPATIENT
Start: 2023-07-12 | End: 2023-07-12 | Stop reason: HOSPADM

## 2023-07-12 RX ORDER — OXYCODONE HYDROCHLORIDE 5 MG/1
5 TABLET ORAL EVERY 4 HOURS PRN
Status: DISCONTINUED | OUTPATIENT
Start: 2023-07-12 | End: 2023-07-14

## 2023-07-12 RX ORDER — DEXAMETHASONE SODIUM PHOSPHATE 4 MG/ML
VIAL (ML) INJECTION AS NEEDED
Status: DISCONTINUED | OUTPATIENT
Start: 2023-07-12 | End: 2023-07-12 | Stop reason: SURG

## 2023-07-12 RX ORDER — MELATONIN
3 NIGHTLY PRN
Status: DISCONTINUED | OUTPATIENT
Start: 2023-07-12 | End: 2023-07-14

## 2023-07-12 RX ORDER — ONDANSETRON 2 MG/ML
4 INJECTION INTRAMUSCULAR; INTRAVENOUS EVERY 6 HOURS PRN
Status: DISCONTINUED | OUTPATIENT
Start: 2023-07-12 | End: 2023-07-14

## 2023-07-12 RX ORDER — OXYCODONE HYDROCHLORIDE 5 MG/1
2.5 TABLET ORAL EVERY 4 HOURS PRN
Status: DISCONTINUED | OUTPATIENT
Start: 2023-07-12 | End: 2023-07-14

## 2023-07-12 RX ORDER — SODIUM CHLORIDE, SODIUM LACTATE, POTASSIUM CHLORIDE, CALCIUM CHLORIDE 600; 310; 30; 20 MG/100ML; MG/100ML; MG/100ML; MG/100ML
INJECTION, SOLUTION INTRAVENOUS CONTINUOUS
Status: DISCONTINUED | OUTPATIENT
Start: 2023-07-12 | End: 2023-07-12

## 2023-07-12 RX ORDER — LIDOCAINE HYDROCHLORIDE ANHYDROUS AND DEXTROSE MONOHYDRATE .8; 5 G/100ML; G/100ML
INJECTION, SOLUTION INTRAVENOUS CONTINUOUS PRN
Status: DISCONTINUED | OUTPATIENT
Start: 2023-07-12 | End: 2023-07-12 | Stop reason: SURG

## 2023-07-12 RX ORDER — HYDROCODONE BITARTRATE AND ACETAMINOPHEN 5; 325 MG/1; MG/1
1 TABLET ORAL ONCE AS NEEDED
Status: DISCONTINUED | OUTPATIENT
Start: 2023-07-12 | End: 2023-07-12 | Stop reason: HOSPADM

## 2023-07-12 RX ORDER — LIDOCAINE HYDROCHLORIDE 10 MG/ML
INJECTION, SOLUTION EPIDURAL; INFILTRATION; INTRACAUDAL; PERINEURAL AS NEEDED
Status: DISCONTINUED | OUTPATIENT
Start: 2023-07-12 | End: 2023-07-12 | Stop reason: SURG

## 2023-07-12 RX ORDER — HYDROMORPHONE HYDROCHLORIDE 1 MG/ML
0.6 INJECTION, SOLUTION INTRAMUSCULAR; INTRAVENOUS; SUBCUTANEOUS EVERY 5 MIN PRN
Status: DISCONTINUED | OUTPATIENT
Start: 2023-07-12 | End: 2023-07-12 | Stop reason: HOSPADM

## 2023-07-12 RX ADMIN — SODIUM CHLORIDE, SODIUM LACTATE, POTASSIUM CHLORIDE, CALCIUM CHLORIDE: 600; 310; 30; 20 INJECTION, SOLUTION INTRAVENOUS at 18:00:00

## 2023-07-12 RX ADMIN — LIDOCAINE HYDROCHLORIDE 50 MG: 10 INJECTION, SOLUTION EPIDURAL; INFILTRATION; INTRACAUDAL; PERINEURAL at 13:20:00

## 2023-07-12 RX ADMIN — DEXAMETHASONE SODIUM PHOSPHATE 8 MG: 4 MG/ML VIAL (ML) INJECTION at 13:40:00

## 2023-07-12 RX ADMIN — SODIUM CHLORIDE, SODIUM LACTATE, POTASSIUM CHLORIDE, CALCIUM CHLORIDE: 600; 310; 30; 20 INJECTION, SOLUTION INTRAVENOUS at 16:12:00

## 2023-07-12 RX ADMIN — ROCURONIUM BROMIDE 10 MG: 10 INJECTION, SOLUTION INTRAVENOUS at 16:04:00

## 2023-07-12 RX ADMIN — LIDOCAINE HYDROCHLORIDE ANHYDROUS AND DEXTROSE MONOHYDRATE 2 MG/KG/HR: .8; 5 INJECTION, SOLUTION INTRAVENOUS at 13:23:00

## 2023-07-12 RX ADMIN — PHENYLEPHRINE HCL 100 MCG: 10 MG/ML VIAL (ML) INJECTION at 17:22:00

## 2023-07-12 RX ADMIN — ROCURONIUM BROMIDE 50 MG: 10 INJECTION, SOLUTION INTRAVENOUS at 13:20:00

## 2023-07-12 RX ADMIN — ROCURONIUM BROMIDE 20 MG: 10 INJECTION, SOLUTION INTRAVENOUS at 14:04:00

## 2023-07-12 RX ADMIN — CEFAZOLIN SODIUM/WATER 2 G: 2 G/20 ML SYRINGE (ML) INTRAVENOUS at 13:23:00

## 2023-07-12 RX ADMIN — PHENYLEPHRINE HCL 100 MCG: 10 MG/ML VIAL (ML) INJECTION at 16:46:00

## 2023-07-12 RX ADMIN — DEXAMETHASONE SODIUM PHOSPHATE 4 MG: 10 INJECTION, SOLUTION INTRAMUSCULAR; INTRAVENOUS at 13:38:00

## 2023-07-12 RX ADMIN — ROCURONIUM BROMIDE 20 MG: 10 INJECTION, SOLUTION INTRAVENOUS at 16:45:00

## 2023-07-12 RX ADMIN — PHENYLEPHRINE HCL 100 MCG: 10 MG/ML VIAL (ML) INJECTION at 13:56:00

## 2023-07-12 RX ADMIN — SODIUM CHLORIDE, SODIUM LACTATE, POTASSIUM CHLORIDE, CALCIUM CHLORIDE: 600; 310; 30; 20 INJECTION, SOLUTION INTRAVENOUS at 18:26:00

## 2023-07-12 RX ADMIN — SODIUM CHLORIDE, SODIUM LACTATE, POTASSIUM CHLORIDE, CALCIUM CHLORIDE: 600; 310; 30; 20 INJECTION, SOLUTION INTRAVENOUS at 13:16:00

## 2023-07-12 RX ADMIN — CEFAZOLIN SODIUM/WATER 2 G: 2 G/20 ML SYRINGE (ML) INTRAVENOUS at 17:23:00

## 2023-07-12 RX ADMIN — ROCURONIUM BROMIDE 10 MG: 10 INJECTION, SOLUTION INTRAVENOUS at 17:44:00

## 2023-07-12 RX ADMIN — ROCURONIUM BROMIDE 20 MG: 10 INJECTION, SOLUTION INTRAVENOUS at 15:03:00

## 2023-07-12 RX ADMIN — ONDANSETRON 4 MG: 2 INJECTION INTRAMUSCULAR; INTRAVENOUS at 18:02:00

## 2023-07-12 RX ADMIN — SODIUM CHLORIDE, SODIUM LACTATE, POTASSIUM CHLORIDE, CALCIUM CHLORIDE: 600; 310; 30; 20 INJECTION, SOLUTION INTRAVENOUS at 16:48:00

## 2023-07-12 RX ADMIN — BUPIVACAINE HYDROCHLORIDE 60 ML: 2.5 INJECTION, SOLUTION EPIDURAL; INFILTRATION; INTRACAUDAL at 13:38:00

## 2023-07-12 RX ADMIN — PHENYLEPHRINE HCL 100 MCG: 10 MG/ML VIAL (ML) INJECTION at 17:44:00

## 2023-07-12 RX ADMIN — SODIUM CHLORIDE: 9 INJECTION, SOLUTION INTRAVENOUS at 13:26:00

## 2023-07-12 RX ADMIN — GLYCOPYRROLATE 0.2 MG: 0.2 INJECTION, SOLUTION INTRAMUSCULAR; INTRAVENOUS at 13:58:00

## 2023-07-12 NOTE — ANESTHESIA PROCEDURE NOTES
Arterial Line    Performed by: Vitor Silva CRNA  Authorized by: Lilly Palmer MD    General Information and Staff    Procedure Start:   Anesthesiologist:  Lilly Palmer MD  CRNA:  Vitor Silva CRNA  Performed By:  CRNA  Patient Location:  OR  Indication: continuous blood pressure monitoring and blood sampling needed    Site Identification: surface landmarks    Preanesthetic Checklist: 2 patient identifiers, IV checked, risks and benefits discussed, monitors and equipment checked, pre-op evaluation, timeout performed, anesthesia consent and sterile technique used    Procedure Details    Catheter Size:  20 G  Catheter Length:  1 and 3/4 inch  Catheter Type:  Arrow  Seldinger Technique?: Yes    Laterality:  Right  Site:  Radial artery  Site Prep: chlorhexidine    Line Secured:  Wrist Brace, tape and Tegaderm    Assessment    Events: patient tolerated procedure well with no complications      Medications      Additional Comments

## 2023-07-12 NOTE — ANESTHESIA PROCEDURE NOTES
Regional Block    Performed by: Carlos Villagomez CRNA  Authorized by: Jessie Haque MD      General Information and Staff    Start Time:   Anesthesiologist:  Jessie Haque MD  CRNA:  Carlos Villagomez CRNA  Performed by:  CRNA  Patient Location:  OR    Block Placement: Post Induction  Site Identification: real time ultrasound guided and image stored and retrievable    Block site/laterality marked before start: site marked  Reason for Block: at surgeon's request and post-op pain management    Preanesthetic Checklist: 2 patient identifers, IV checked, risks and benefits discussed, monitors and equipment checked, pre-op evaluation, timeout performed, anesthesia consent, sterile technique used, no prohibitive neurological deficits and no local skin infection at insertion site      Procedure Details    Patient Position:  Supine  Prep: ChloraPrep    Monitoring:  Cardiac monitor, continuous pulse ox and blood pressure cuff  Block Type:  TAP  Laterality:  Bilateral  Injection Technique:  Single-shot    Needle    Needle Type:  Short-bevel and echogenic  Needle Gauge:  21 G  Needle Length:  110 mm  Needle Localization:  Ultrasound guidance  Reason for Ultrasound Use: appropriate spread of the medication was noted in real time and no ultrasound evidence of intravascular and/or intraneural injection            Assessment    Injection Assessment:  Good spread noted, negative resistance, negative aspiration for heme, incremental injection, low pressure, local visualized surrounding nerve on ultrasound and no pain on injection  Paresthesia Pain:  None  Heart Rate Change: No    - Patient tolerated block procedure well without evidence of immediate block related complications.      Medications      Additional Comments    Medication:  Bupivacaine 0.25% 30mL & PF Decadron 2mg per side

## 2023-07-12 NOTE — BRIEF OP NOTE
Pre-Operative Diagnosis: LEFT RENAL MASS     Post-Operative Diagnosis: LEFT RENAL MASS      Procedure Performed:   ROBOTIC ASSISTED LEFT PARTIAL NEPHRECTOMY WITH INTRAOPERATIVE ULTRASOUND    Surgeon(s) and Role:     Brad Lamas MD - Primary    Assistant(s):  Surgical Assistant.: [facility]     Surgical Findings: Left upper pole lateral renal mass, excised with grossly negative margins. Adherent Gerota's fat to the renal capsule led to a prolonged dissection time. 22 modifier      Specimen: Left renal mass, fat overlying left renal mass     Estimated Blood Loss: Blood Output: 100 mL (7/12/2023  6:20 PM)    Dictation Number:  Will type.      Lauryn Montilla MD  7/12/2023  6:40 PM

## 2023-07-12 NOTE — H&P
Urology Pre Op H&P  Encounter Date: 7/12/2023    Chief Complaint:   Post-Op TURP, Left renal mass    History of Present Illness:   Collin Ordaz is a 81year old male who presents today for follow-up after undergoing a TURP and cystolitholapaxy on 5/23/2023. He presents today to discuss voiding symptoms, and to review our plan for a left partial nephrectomy on 7/12, which is being done due to a left renal mass, suspicious for RCC. Pressure and dysuria have improved, but still having some frequency and urgency at times. Pain has improved. Frequency is most bothersome symptom still. Some double voiding. He is due for a robotic-assisted left partial nephrectomy next week. Random Bladder Scan: 25mL    Past Medical History:   Diagnosis Date   Hyperlipidemia   Insomnia   Kidney stones 1985, 2014 2014: Distal left ureteral calculus resulting in hydronephrosis,     Past Surgical History:   Procedure Laterality Date   COLONOSCOPY 02/20/2012   findings: normal, repeat 5 yr; by Dr. Juanjose Marlow 02/18/2008   findings: polyps, repeat 5 yr; by Dr. Ángel Forbes 04/03/2023   Cysto-Dr. Bhupinder Mendez     Allergies:  Penicillins  Current Outpatient Medications   Medication Sig Dispense Refill   losartan 100 MG Oral Tab Take 0.5 tablets (50 mg total) by mouth daily. 90 tablet 3   rosuvastatin 10 MG Oral Tab Take 1 tablet (10 mg total) by mouth daily. 90 tablet 3     Social History:  He reports that he has never smoked. He has never used smokeless tobacco. He reports current alcohol use. He reports that he does not use drugs. Family History   Problem Relation Age of Onset   Stroke Maternal Grandfather   Diabetes Brother     Review of Systems:   General: Denies anorexia, weight loss, fevers, chills, night sweats, or other constitutional symptoms.    Neurologic: Denies headaches, stiff neck, photophobia, numbness, or weakness of extremities. Psychiatric: Denies anxiety, depression. Eyes: Denies vision changes. Skin: Denies skin changes or rash. Cardiac: Denies chest pain, palpitations, or orthopnea. Pulmonary: Denies cough, hemoptysis, shortness of breath, or dyspnea on exertion. GI: Denies abdominal pain, nausea, vomiting, or changes in bowel movements. Musculoskeletal: Denies extremity pain, weakness. : See HPI. Physical Examination:   There were no vitals taken for this visit. There is no height or weight on file to calculate BMI. General: Awake, Alert, Oriented. Well-nourished. Appears stated age. Neurologic: No focal deficits. Normal gait. HEENT: EOMI. No scleral icterus. Cardiac: Regular rate and rhythm. Respiratory: Non-labored respirations. Abdomen: Soft, NT/ND. Extremities: Warm, well-perfused. No palpable edema. Skin: Normal-appearing. No rash or lesions. Genitourinary: Deferred    Laboratory Review:   Labs reviewed    Radiology Review:   I have personally reviewed all pertinent imaging. CT Urogram 3/16/2023:  1. . Enhancing posterior left mid pole mass measuring 3.5 x 2.7 cm with central low density worrisome for renal neoplasm, either partially cystic or centrally necrotic. A prior outside hospital 2014 CT report in care everywhere describes irregular contour irregularity of the left kidney presumably representing interval growth. Consider correlation with prior imaging and urology consultation. Bilateral non-obstructing renal stones. 2. Underdistended urinary bladder with multiple bladder stones the largest measuring up to 1.7 cm. Nonenhancing intermediate density in the urinary bladder may reflect blood products or a mass. Correlation with cystoscopy is recommended. Pathology Review:   TURP / Cystolitholapaxy 7/23/2023:  Final Diagnosis:   A: Bladder calculi, removal:  -Calculi, gross diagnosis only.   -See gross description for further information.  -See comment. B: Prostate chips, TURP:  -Prostatic, and focal urothelial, tissue with glandular and stromal hyperplasia (BPH),   patchy chronic inflammation, metaplastic, and reactive changes. Assessment:   In summary, Rose Mazariegos is a 81year old male with BPH with LUTS and bladder calculi who presents today for a void trial after undergoing a TURP and cystolitholapaxy on 5/23/2023. He also has a left renal mass, with plans to undergo a robotic-assisted left partial nephrectomy in July, 2023. Plan:   Left renal mass:  After careful personal review of all the outside medical records, films and laboratory studies, as well as the patient's stated medical history as outlined above, I discussed with the patient at length the finding of a solid, enhancing renal lesion on the imaging studies provided. We discussed the differential diagnosis of solid, enhancing renal masses and I explained that roughly 75-80% of these lesions represent renal cell carcinoma (RCC), while 20-25% of these lesions represent a benign tumor such as an oncocytoma or angiomyolipoma (AML). The patient understands that in the majority of cases, if there are solid components, it is not possible to discern the type of tumor involving the kidney and that renal mass biopsy (RMB) is not typically recommended. The medical reasoning and rationale for this was discussed in detail. We also discussed that renal cell carcinoma is a heterogeneous disease and that the ultimate prognosis of their disease depends largely on pathologic features such as histology, nuclear grade and clinical/pathologic stage. We reviewed the various clinical and pathological stages and assigned the patient a clinical stage based on their clinical and radiographic evaluation. We had a lengthy discussion regarding the various treatment options for incidental renal masses.  These include surgery, percutaneous ablation (not advised for cystic lesions) and/or active surveillance. Each of these strategies and the medical rationale as well as the risks and benefits were discussed in detail. The surgical options for the treatment of localized renal masses were discussed. These include radical nephrectomy, partial nephrectomy or ablative techniques. They understand that these may be performed via open or minimally-invasive approaches. Regarding nephron sparing surgery (NSS) or partial nephrectomy (PN), the rationale for this approach was outlined in detail including the way this operation is performed and that frozen sections may be obtained in the OR at the time of surgery to assure complete resection. The oncologic results and reasoning for elective, relative and absolute indications for NSS were discussed in detail, as were the additional risks of urinary fistula formation, loss of renal function, the need for temporary or permanent dialysis, the need for a temporary drain at the operative site and the rare need for conversion to a radical nephrectomy at the time of surgery, or in a delayed fashion. We reviewed the risks for immediate or delayed, temporary or permanent dialysis (renal replacement therapy) and the risks in the context of their history. The expected hospital and post-operative courses were reviewed. I have discussed the risks, benefits and alternatives to the proposed procedure/treatment plan with the patient. Our discussion also included the risks and benefits of the alternatives treatment options, including doing nothing. They were encouraged to ask questions and all questions were answered to their satisfaction. At the end of our discussion they gave their verbal consent to the proposed procedure/treatment plan.     Keshia Kumar MD  [facility]  Department of Urology  Office: [phone]

## 2023-07-12 NOTE — ANESTHESIA PROCEDURE NOTES
Peripheral IV  Date/Time: 7/12/2023 1:26 PM  Inserted by: Charlene Cerna CRNA    Placement  Needle size: 18 G  Laterality: right  Location: wrist  Local anesthetic: none  Site prep: alcohol  Technique: anatomical landmarks  Attempts: 1

## 2023-07-12 NOTE — ANESTHESIA PROCEDURE NOTES
Airway  Date/Time: 7/12/2023 1:22 PM  Urgency: elective    Airway not difficult    General Information and Staff    Patient location during procedure: OR  Anesthesiologist: Chiqui Carrera MD  Resident/CRNA: Leslie Plata CRNA  Performed: CRNA   Performed by: Leslie Plata CRNA  Authorized by: Chiqui Carrera MD      Indications and Patient Condition  Indications for airway management: anesthesia  Spontaneous Ventilation: absent  Sedation level: deep  Preoxygenated: yes  Patient position: sniffing  Mask difficulty assessment: 2 - vent by mask + OA or adjuvant +/- NMBA    Final Airway Details  Final airway type: endotracheal airway      Successful airway: ETT  Cuffed: yes   Successful intubation technique: direct laryngoscopy  Facilitating devices/methods: intubating stylet  Endotracheal tube insertion site: oral  Blade: Leslie  Blade size: #3  ETT size (mm): 8.0    Cormack-Lehane Classification: grade I - full view of glottis  Placement verified by: capnometry   Measured from: lips  ETT to lips (cm): 22  Number of attempts at approach: 1  Number of other approaches attempted: 0    Additional Comments  DENTITION PER PRE OP

## 2023-07-13 LAB
ANION GAP SERPL CALC-SCNC: 6 MMOL/L (ref 0–18)
BUN BLD-MCNC: 22 MG/DL (ref 7–18)
CALCIUM BLD-MCNC: 8.6 MG/DL (ref 8.5–10.1)
CHLORIDE SERPL-SCNC: 108 MMOL/L (ref 98–112)
CO2 SERPL-SCNC: 23 MMOL/L (ref 21–32)
CREAT BLD-MCNC: 1.73 MG/DL
CREAT FLD-MCNC: 2.78 MG/DL
CREAT FLD-MCNC: 4.33 MG/DL
ERYTHROCYTE [DISTWIDTH] IN BLOOD BY AUTOMATED COUNT: 12.9 %
GFR SERPLBLD BASED ON 1.73 SQ M-ARVRAT: 42 ML/MIN/1.73M2 (ref 60–?)
GLUCOSE BLD-MCNC: 161 MG/DL (ref 70–99)
HCT VFR BLD AUTO: 39.4 %
HGB BLD-MCNC: 13.4 G/DL
MAGNESIUM SERPL-MCNC: 1.8 MG/DL (ref 1.6–2.6)
MCH RBC QN AUTO: 29.3 PG (ref 26–34)
MCHC RBC AUTO-ENTMCNC: 34 G/DL (ref 31–37)
MCV RBC AUTO: 86.2 FL
OSMOLALITY SERPL CALC.SUM OF ELEC: 291 MOSM/KG (ref 275–295)
PLATELET # BLD AUTO: 199 10(3)UL (ref 150–450)
POTASSIUM SERPL-SCNC: 4.4 MMOL/L (ref 3.5–5.1)
RBC # BLD AUTO: 4.57 X10(6)UL
SODIUM SERPL-SCNC: 137 MMOL/L (ref 136–145)
WBC # BLD AUTO: 8.7 X10(3) UL (ref 4–11)

## 2023-07-13 PROCEDURE — 82570 ASSAY OF URINE CREATININE: CPT

## 2023-07-13 PROCEDURE — 83735 ASSAY OF MAGNESIUM: CPT | Performed by: UROLOGY

## 2023-07-13 PROCEDURE — 80048 BASIC METABOLIC PNL TOTAL CA: CPT | Performed by: UROLOGY

## 2023-07-13 PROCEDURE — 36415 COLL VENOUS BLD VENIPUNCTURE: CPT | Performed by: UROLOGY

## 2023-07-13 PROCEDURE — 85027 COMPLETE CBC AUTOMATED: CPT | Performed by: UROLOGY

## 2023-07-13 RX ORDER — ROSUVASTATIN CALCIUM 5 MG/1
10 TABLET, COATED ORAL NIGHTLY
Status: DISCONTINUED | OUTPATIENT
Start: 2023-07-13 | End: 2023-07-14

## 2023-07-13 RX ORDER — MAGNESIUM OXIDE 400 MG/1
400 TABLET ORAL ONCE
Status: COMPLETED | OUTPATIENT
Start: 2023-07-13 | End: 2023-07-13

## 2023-07-13 NOTE — PROGRESS NOTES
ELVIS creatinine at 1600 is 4.33. Urology paged. Spoke with urology. Repeat ELVIS creatinine ordered for AM. POC updated with pt.

## 2023-07-13 NOTE — PLAN OF CARE
Pt A&Ox4. VSS on room air. L eye swelling improved per pt. Lap sites to abdomen with dermabond C/D/I. ELVIS drain to L abdomen intact to bulb suction. On soft diet this AM, advancing as tolerated. Joe catheter in place. Last BM 7/11/23. Pain controlled with PRN medications. Fall precautions in place and pt reminded to \"call; don't fall. \" Plan to discharge home when cleared, possibly later this afternoon. 1300 - Unable to get a hold of ophthalmology. Dr Gudelia Blakely made aware. She will check with Dr Rolande Sever if consult is still needed. 1600 - Ophthalmology consult cancelled per Dr Gudelia Blakely.

## 2023-07-13 NOTE — OPERATIVE REPORT
OPERATIVE REPORT     PATIENT NAME: Foster Limon  YOB: 1950  DATE OF SERVICE: 7/12/2023     SURGEON:  Robin Smith MD     ASSISTANT:  Sandip Prince     PREOPERATIVE DIAGNOSIS:  3.5 cm left upper pole posterior renal mass     POSTOPERATIVE DIAGNOSIS:  Same     PROCEDURES PERFORMED:  Robotic-assisted left partial nephrectomy  Intraoperative ultrasound    Please add a -22 modifier to the procedure. The patient had extremely adherent perinephric fat to the renal capsule, which led to a significantly prolonged dissection time to identify and resect his tumor. ANESTHESIA:  General     ANTIBIOTIC PROPHYLAXIS:  Ancef     SPECIMENS:  Left renal mass  Fat overlying left renal mass     DRAINS:  1. 16F gates catheter  2. 15F nayeli drain     ESTIMATED BLOOD LOSS:  729PC     COMPLICATIONS:  No immediate complications     FINDINGS:  Left upper pole posterior renal mass requiring complete mobilization of the kidney. Tumor excised with grossly negative margins. 38 min warm ischemia time. INDICATIONS FOR PROCEDURE:  Ms. Bhargavi Newman is a 74year old woman who was incidentally found to have an ~4cm left upper pole renal mass that was solid and enhancing, and therefore suspicious for renal cell carcinoma. She also had some slightly enlarged paraaortic lymph nodes. She was counseled about her treatment options, and elected to proceed with a robotic-assisted left partial nephrectomy and hilar lymphadenectomy. Please see my pre-operative note and office note for procedural counseling, including the risks of surgery. DESCRIPTION OF PROCEDURE:  After reviewing the indications for the procedure, informed consent was reviewed and signed by the patient. She was marked on her left side to confirm laterality. He was brought to the OR and placed in the supine position on the OR table. SCDs were applied and pressure points were carefully padded. He received perioperative antibiotics as well as Subcutaneous heparin. General anesthesia was induced. A gates catheter was placed under sterile conditions. A left-sided TAP block was performed by anesthesia. He was then transferred to the right lateral decubitus position, with his left side up in the air. The table was flexed, to increase the space between the costal margin and ASIS. An axillary roll was placed and she was secured to the table using silk tape. His abdomen was prepped and draped in the usual sterile fashion. A procedural timeout was performed where the patient, procedure, and laterality were identified using the patient's full name and date of birth. I began by inserting a Veress needle into the left upper quadrant. A drop test returned no evidence of blood or enteric contents. I insufflated to 15mmHg. I then inserted a 8mm robotic port through this incision. A robotic camera was used to inspect the peritoneum. No specific pathology was visualized. I then placed the remainder of my ports under direct vision. 3 separate 8mm robotic ports were placed in vertical line towards the left of her midline. A 12mm assistant port was placed cephalad to the umbilicus. The table was \"airplaned\" to the right to help facilitate gravity retraction of the bowel. The surgical robot was then docked and instruments were inserted under direct vision. I began by incising along the Line of Toldt, to reflect the descending colon medially. I developed a plane between Gerota's fascia and the colonic mesentery. I identified the left ureter and gonadal vein. I retracted the ureter and lower pole of the kidney anterior and developed a plane between the posterior aspect of the kidney and the psoas muscle. I dissected cephalad along the gonadal vein until I identified the renal vein. I skeletonized the renal vein and identified a solitary left renal artery posterior to the vein. I dissected the artery free, so that a bulldog clamp could later be placed.       I then defatted the kidney along its anterior surface. The patient had extremely adherent perinephric fat, which made identification of the renal capsule challenging. I was eventually able to identify the capsule, and I slowly dissected the fat off of the surface of the kidney within Gerota's fascia to rotate the kidney medially to obtain access to the posterior upper pole. This was extremely slow going, due to the adherence of fat to the renal capsule. I eventually was able to identify his tumor and used an intraoperative ultrasound probe to confirm that this was indeed the lesion of interest.  I dissected around the tumor and scored the renal capsule with cautery to help delineate my margins of resection. Fat overlying the tumor was removed and was sent to pathology for evaluation as \"fat overlying left renal mass. \"      A bulldog clamp was then placed on the renal artery to induce left renal ischemia. I then used a combination of blunt and sharp dissection to resect the tumor with grossly negative margins. I fulgurated the base of the defect to obtain an additional margin. The tumor did abut the collecting system at its base and there was one collecting system entry noted. I cauterized the base of the renal defect to obtain an additional margin. The tumor was placed in an endo catch bag for later retrieval.     I then oversewed the base of the defect using a 3-0 v lock suture in a running fashion. Care was taken to close the collecting system entry with this suture. I then performed a sliding clip renorrhaphy over a surgicel bolster, using a 2-0 v lock suture. The bulldog clamp was removed from the renal artery at this point, and hemostasis appeared good. Warm ischemia time was 38 minutes. Additional surgicel and Floseal was also placed over the defect. Gerota's fascia was closed back over the kidney as well using hemo lock clips.      A 15F nayeli drain was placed through the robotic 4th arm port and laid laterally to the kidney. The robot was undocked after instruments had been removed. The 12mm assistant port was extended, to facilitate extraction of the tumor within the endo catch bag. Fascia was then closed using 0 PDS sutures in a figure-of-eight fashion. We reinserted a robotic camera and inspected the fascial closure to ensure no bowel or omentum was snared in the closure. Hemostasis remained excellent. Skin was closed using 4-0 monocryl sutures and derma bond. He was then awoken and transferred to the recovery room in stable condition. Our sponge, instrument, and needle counts were correct x2 at the completion of the procedure. He tolerated it well, without any immediate complications. Radames Rojas served as my bedside assistant for the entirety of the procedure. I was present for and performed the entirety of the procedure. PLAN:  Admit to observation. Check labs in PACU and tomorrow AM.  Monitor ELVIS output and urine output. I anticipate a 1-2 night stay.         Ana Graham MD  [facility]  Department of Urology  Office: [phone]

## 2023-07-13 NOTE — PLAN OF CARE
Patient is alert and oriented, on room air. Post op from left partial nephrectomy. Patient has incision to the left abd with gauze and ELVIS drain. ELVIS drain has moderate output of serosanguineous drainage. Denies pain and nausea at this time. IV infusing to the right wrist.  Left eye is red and swollen. Topical antibiotics given in PACU. Joe intact, draining clear yellow urine. Son at bedside. Patient tolerating clear liquids. Denies passing gas. Plan of care and pain education discussed with patient and son. NO further questions or concerns at this time.

## 2023-07-14 VITALS
RESPIRATION RATE: 17 BRPM | SYSTOLIC BLOOD PRESSURE: 140 MMHG | DIASTOLIC BLOOD PRESSURE: 78 MMHG | OXYGEN SATURATION: 91 % | TEMPERATURE: 99 F | HEART RATE: 81 BPM | HEIGHT: 72 IN | WEIGHT: 220 LBS | BODY MASS INDEX: 29.8 KG/M2

## 2023-07-14 LAB
ANION GAP SERPL CALC-SCNC: 5 MMOL/L (ref 0–18)
BUN BLD-MCNC: 18 MG/DL (ref 7–18)
CALCIUM BLD-MCNC: 8.6 MG/DL (ref 8.5–10.1)
CHLORIDE SERPL-SCNC: 106 MMOL/L (ref 98–112)
CO2 SERPL-SCNC: 26 MMOL/L (ref 21–32)
CREAT BLD-MCNC: 1.55 MG/DL
CREAT FLD-MCNC: 2.28 MG/DL
ERYTHROCYTE [DISTWIDTH] IN BLOOD BY AUTOMATED COUNT: 13 %
GFR SERPLBLD BASED ON 1.73 SQ M-ARVRAT: 48 ML/MIN/1.73M2 (ref 60–?)
GLUCOSE BLD-MCNC: 136 MG/DL (ref 70–99)
HCT VFR BLD AUTO: 38.6 %
HGB BLD-MCNC: 12.7 G/DL
MAGNESIUM SERPL-MCNC: 2.1 MG/DL (ref 1.6–2.6)
MCH RBC QN AUTO: 29.5 PG (ref 26–34)
MCHC RBC AUTO-ENTMCNC: 32.9 G/DL (ref 31–37)
MCV RBC AUTO: 89.6 FL
OSMOLALITY SERPL CALC.SUM OF ELEC: 288 MOSM/KG (ref 275–295)
PLATELET # BLD AUTO: 161 10(3)UL (ref 150–450)
POTASSIUM SERPL-SCNC: 3.9 MMOL/L (ref 3.5–5.1)
RBC # BLD AUTO: 4.31 X10(6)UL
SODIUM SERPL-SCNC: 137 MMOL/L (ref 136–145)
WBC # BLD AUTO: 7.9 X10(3) UL (ref 4–11)

## 2023-07-14 PROCEDURE — 80048 BASIC METABOLIC PNL TOTAL CA: CPT | Performed by: UROLOGY

## 2023-07-14 PROCEDURE — 85027 COMPLETE CBC AUTOMATED: CPT | Performed by: UROLOGY

## 2023-07-14 PROCEDURE — 82570 ASSAY OF URINE CREATININE: CPT | Performed by: UROLOGY

## 2023-07-14 PROCEDURE — 83735 ASSAY OF MAGNESIUM: CPT | Performed by: UROLOGY

## 2023-07-14 RX ORDER — POLYETHYLENE GLYCOL 3350 17 G/17G
17 POWDER, FOR SOLUTION ORAL DAILY PRN
Qty: 20 EACH | Refills: 0 | Status: SHIPPED | OUTPATIENT
Start: 2023-07-14

## 2023-07-14 NOTE — PLAN OF CARE
NURSING DISCHARGE NOTE    Discharged Home via Wheelchair. Accompanied by Support staff. Belongings Taken by patient/family. Verbal and written discharge instructions reviewed with him and spouse. Return demonstrated on emptying drain. Per physician, pain medication prescriptions electronically sent to patient pharmacy.

## 2023-07-14 NOTE — DISCHARGE INSTRUCTIONS
Pain prescription was sent thru [facility] to your local [address] [place] [address]    Keep dressing to drain site clean, dry, intact. Keep steri strips clean, dry, intact. Follow diet as instructed by physician. Keep a daily recording of your drain outputs to review at your upcoming visit with Dr Scanlon Daily on Monday    Notify physician if you experience any of the followin. Fever  2. . persistent Nausea/vomiting  3. severe uncontrolled pain  4. redness, tenderness, or signs of infection (pain, swelling, redness, odor or green/yellow discharge around incision site)  5. difficulty breathing, headache or visual disturbances  6. hives  7. persistent dizziness or light-headedness  8. extreme fatigue  9. Numbness/tingling  10. Difficulty urinating or defecating  11. Bleeding    Do not drive when taking pain medications    FOR PAIN CONTROL:   Ok to alternate ibuprofen with acetaminophen for mild pain   Ibuprofen up to 600mg every 6 hours  Acetaminophen 1000mg every 4 hours   Do not exceed 4 g acetaminophen within 24 hours    Ok to take tramadol for moderate pain   Ok to take hydrocodone for severe pain.  Please call office if severe pain uncontrolled       [facility]  [phone]

## 2023-07-14 NOTE — PLAN OF CARE
States voided without difficulty. Physician aware of voided amounts and post void residuals. Ambulated in hallway. Drain site dressing clean, dry, intact. Steri strips clean,dry, intact. Instructed on plan of care, call for all asst needed, discomfort felt. Verbalized understanding. States pain well controlled on oral pain medication, tolerating diet, and ready to go home.

## 2023-07-14 NOTE — PLAN OF CARE
Patient A&Ox4 . Vital signs stable , denies any chest pain or short of breath . Pain is controlled with oxy codone . Tolerating regular diet . Joe draining clear yellow urine . ELVIS to left abdomen intact to suction with small amount of drainage . lap sites with skin glue clean and dry . Encouraged use of IS and ambulation . Last BM 7/11 . Passing flatus . Started on colace . Repeat labs in am and the possible discharge tomorrow .

## 2023-07-15 ENCOUNTER — HOSPITAL ENCOUNTER (EMERGENCY)
Facility: HOSPITAL | Age: 73
Discharge: HOME | End: 2023-07-15
Attending: EMERGENCY MEDICINE
Payer: MEDICARE

## 2023-07-15 VITALS
WEIGHT: 218 LBS | OXYGEN SATURATION: 95 % | HEART RATE: 84 BPM | BODY MASS INDEX: 29.53 KG/M2 | DIASTOLIC BLOOD PRESSURE: 83 MMHG | SYSTOLIC BLOOD PRESSURE: 146 MMHG | HEIGHT: 72 IN | RESPIRATION RATE: 16 BRPM | TEMPERATURE: 98 F

## 2023-07-15 DIAGNOSIS — R31.9 URINARY TRACT INFECTION WITH HEMATURIA, SITE UNSPECIFIED: ICD-10-CM

## 2023-07-15 DIAGNOSIS — R33.9 URINARY RETENTION: Primary | ICD-10-CM

## 2023-07-15 DIAGNOSIS — N39.0 URINARY TRACT INFECTION WITH HEMATURIA, SITE UNSPECIFIED: ICD-10-CM

## 2023-07-15 LAB
BILIRUB UR QL STRIP.AUTO: NEGATIVE
COLOR UR AUTO: YELLOW
GLUCOSE UR STRIP.AUTO-MCNC: NEGATIVE MG/DL
KETONES UR STRIP.AUTO-MCNC: NEGATIVE MG/DL
NITRITE UR QL STRIP.AUTO: NEGATIVE
PH UR STRIP.AUTO: 5 [PH] (ref 5–8)
PROT UR STRIP.AUTO-MCNC: 30 MG/DL
RBC #/AREA URNS AUTO: >10 /HPF
SP GR UR STRIP.AUTO: 1.01 (ref 1–1.03)
UROBILINOGEN UR STRIP.AUTO-MCNC: <2 MG/DL
WBC #/AREA URNS AUTO: >50 /HPF

## 2023-07-15 PROCEDURE — 99284 EMERGENCY DEPT VISIT MOD MDM: CPT

## 2023-07-15 PROCEDURE — 87086 URINE CULTURE/COLONY COUNT: CPT | Performed by: EMERGENCY MEDICINE

## 2023-07-15 PROCEDURE — 99283 EMERGENCY DEPT VISIT LOW MDM: CPT

## 2023-07-15 PROCEDURE — 81001 URINALYSIS AUTO W/SCOPE: CPT | Performed by: EMERGENCY MEDICINE

## 2023-07-15 RX ORDER — SULFAMETHOXAZOLE AND TRIMETHOPRIM 800; 160 MG/1; MG/1
1 TABLET ORAL ONCE
Status: COMPLETED | OUTPATIENT
Start: 2023-07-15 | End: 2023-07-15

## 2023-07-15 RX ORDER — LIDOCAINE HYDROCHLORIDE 20 MG/ML
10 JELLY TOPICAL ONCE
Status: COMPLETED | OUTPATIENT
Start: 2023-07-15 | End: 2023-07-15

## 2023-07-15 RX ORDER — SULFAMETHOXAZOLE AND TRIMETHOPRIM 800; 160 MG/1; MG/1
1 TABLET ORAL 2 TIMES DAILY
Qty: 14 TABLET | Refills: 0 | Status: SHIPPED | OUTPATIENT
Start: 2023-07-15 | End: 2023-07-22

## 2023-07-15 NOTE — DISCHARGE SUMMARY
[facility]  Discharge Summary    Evonrivas King [facility] Patient Status:  Inpatient    1950 MRN [id number]   [facility] 3SW-A Attending No att. providers found   Hosp Day # 2 PCP Agnieszka Rasmussen MD         Admit date: 2023    Discharge date and time: 2023  4:23 PM     Admitting Physician: Mary Dang MD     Discharge Physician: Gaston Teixeira    Admission Diagnoses: LEFT RENAL MASS  Left renal mass    Discharge Diagnoses: Same    Admission Condition: good    Discharged Condition: good    Indication for Admission: Left renal mass    Hospital Course: Admitted on 2023 and underwent robotic-assisted left partial nephrectomy. Transferred to surgical floor. Joe in place POD 1 and POD 2, removed AM of POD 2.  ELVIS drain output was low, however ELVIS creatinine was noted to be slightly elevated compared to serum creatinine, suggestive of a small urinary fistula. For this reason, drain will be left in place upon discharge, with close follow-up on Monday next week to check drain creatinine again. He was discharged to home on 2023. Consults: Hospitalist    Significant Diagnostic Studies: None    Treatments: surgery: Robotic-assisted left partial nephrectomy    Discharge Exam:  See progress note from day of discharge    Disposition: Home or Self Care    Patient Instructions:      Discharge Medications        START taking these medications        Instructions Prescription details   polyethylene glycol (PEG 3350) 17 g Pack  Commonly known as: Miralax      Take 17 g by mouth daily as needed. Quantity: 20 each  Refills: 0            CONTINUE taking these medications        Instructions Prescription details   losartan 100 MG Tabs  Commonly known as: Cozaar      Take 1 tablet (100 mg total) by mouth daily. Quantity: 30 tablet  Refills: 5     rosuvastatin 10 MG Tabs  Commonly known as: Crestor      Take 1 tablet (10 mg total) by mouth daily.  Patient needs appointment with PCP for future refills Quantity: 90 tablet  Refills: 3     traMADol 50 MG Tabs  Commonly known as: Ultram      Take 1-2 tablets ( mg total) by mouth every 4 (four) hours as needed for Pain. Quantity: 12 tablet  Refills: 0            STOP taking these medications      ibuprofen 200 MG Tabs  Commonly known as: Motrin                  Where to Get Your Medications        These medications were sent to [address], [phone]  [address] [place] [address]      Phone: [phone]   polyethylene glycol (PEG 3350) 17 g Pack       Activity: activity as tolerated  Diet: regular diet  Wound Care: keep wound clean and dry    Follow-up with Lacey Kimball in 3 days.     Signed:  Lacey Kimball MD  7/15/2023  9:13 AM

## 2023-07-16 NOTE — ED INITIAL ASSESSMENT (HPI)
Pt presents to ed c/o urinary retention since this afternoon. Pt states he had partial nephrectomy on Wednesday, last bm Wednesday.

## 2023-07-16 NOTE — PAYOR COMM NOTE
--------------  DISCHARGE REVIEW    Villa Chavez MA O  Subscriber #:  [id number]  Authorization Number: [id number]    Admit date: 23  Admit time:  10:16 PM  Discharge Date: 2023  4:23 PM     Admitting Physician: Maxwell Mac MD  Attending Physician:  No att. providers found  Primary Care Physician: Jeffrey Chow MD          Discharge Summary Notes        Discharge Summary signed by Maxwell Mac MD at 7/15/2023  9:15 AM       Author: Maxwell Mac MD Specialty: UROLOGY Author Type: Physician    Filed: 7/15/2023  9:15 AM Date of Service: 7/15/2023  9:13 AM Status: Signed    : Maxwell Mac MD (Physician)         [facility]  Discharge Summary    Rikki Mckeon Patient Status:  Inpatient    1950 MRN [id number]   Location [address] Attending No att. providers found   Hosp Day # 2 PCP Eliud Alanis MD         Admit date: 2023    Discharge date and time: 2023  4:23 PM     Admitting Physician: Kevon Owens MD     Discharge Physician: Yuri Bolton    Admission Diagnoses: LEFT RENAL MASS  Left renal mass    Discharge Diagnoses: Same    Admission Condition: good    Discharged Condition: good    Indication for Admission: Left renal mass    Hospital Course: Admitted on 2023 and underwent robotic-assisted left partial nephrectomy. Transferred to surgical floor. Joe in place POD 1 and POD 2, removed AM of POD 2.  ELVIS drain output was low, however ELVIS creatinine was noted to be slightly elevated compared to serum creatinine, suggestive of a small urinary fistula. For this reason, drain will be left in place upon discharge, with close follow-up on Monday next week to check drain creatinine again. He was discharged to home on 2023.      Consults: Hospitalist    Significant Diagnostic Studies: None    Treatments: surgery: Robotic-assisted left partial nephrectomy    Discharge Exam:  See progress note from day of discharge    Disposition: Home or Self Care    Patient Instructions:      Discharge Medications        START taking these medications        Instructions Prescription details   polyethylene glycol (PEG 3350) 17 g Pack  Commonly known as: Miralax      Take 17 g by mouth daily as needed. Quantity: 20 each  Refills: 0            CONTINUE taking these medications        Instructions Prescription details   losartan 100 MG Tabs  Commonly known as: Cozaar      Take 1 tablet (100 mg total) by mouth daily. Quantity: 30 tablet  Refills: 5     rosuvastatin 10 MG Tabs  Commonly known as: Crestor      Take 1 tablet (10 mg total) by mouth daily. Patient needs appointment with PCP for future refills   Quantity: 90 tablet  Refills: 3     traMADol 50 MG Tabs  Commonly known as: Ultram      Take 1-2 tablets ( mg total) by mouth every 4 (four) hours as needed for Pain. Quantity: 12 tablet  Refills: 0            STOP taking these medications      ibuprofen 200 MG Tabs  Commonly known as: Motrin                  Where to Get Your Medications        These medications were sent to [address] [place] [address] [phone]  [address] [place] [address]      Phone: [phone]   polyethylene glycol (PEG 3350) 17 g Pack       Activity: activity as tolerated  Diet: regular diet  Wound Care: keep wound clean and dry    Follow-up with Jair Umanzor in 3 days.     Signed:  Jair Umanzor MD  7/15/2023  9:13 AM      Electronically signed by Magdaleno Hung MD on 7/15/2023  9:15 AM         REVIEWER COMMENTS

## (undated) DEVICE — STERILE POLYISOPRENE POWDER-FREE SURGICAL GLOVES: Brand: PROTEXIS

## (undated) DEVICE — ADHESIVE MASTISOL 2/3ML

## (undated) DEVICE — TOWEL SURG OR 17X30IN BLUE

## (undated) DEVICE — SYRINGE 30ML LL TIP

## (undated) DEVICE — ANCHOR TISSUE RETRIEVAL SYSTEM, BAG SIZE 175 ML, PORT SIZE 10 MM: Brand: ANCHOR TISSUE RETRIEVAL SYSTEM

## (undated) DEVICE — ROBOTIC GENERAL: Brand: MEDLINE INDUSTRIES, INC.

## (undated) DEVICE — SOL H2O 1000ML

## (undated) DEVICE — BLADELESS OBTURATOR: Brand: WECK VISTA

## (undated) DEVICE — PROGRASP FORCEPS: Brand: ENDOWRIST

## (undated) DEVICE — LAPAROVUE VISIBILITY SYSTEM LAPAROSCOPIC SOLUTIONS: Brand: LAPAROVUE

## (undated) DEVICE — SUT MONOCRYL 4-0 PS-2 Y496G

## (undated) DEVICE — FENESTRATED BIPOLAR FORCEPS: Brand: ENDOWRIST

## (undated) DEVICE — SYRINGE,TOOMEY,IRRIGATION,70CC,STERILE: Brand: MEDLINE

## (undated) DEVICE — SUT PDS II 0 CT-1 Z346H

## (undated) DEVICE — FLEXIVA  PULSE  AND  FLEXIVA  PULSE  TRACTIP  LASER  FIBERS  ARE  HIGH  POWER  SINGLE-USE FIBER: Brand: FLEXIVA PULSE ID

## (undated) DEVICE — TIP COVER ACCESSORY

## (undated) DEVICE — CLIP HEMOLOK LARGE PURPLE

## (undated) DEVICE — STERILE WATER 1000ML BTL

## (undated) DEVICE — CANNULA SEAL

## (undated) DEVICE — MONOPOLAR CURVED SCISSORS: Brand: ENDOWRIST

## (undated) DEVICE — SPONGE STICK WITH PVP-I: Brand: KENDALL

## (undated) DEVICE — SUT VICRYL 0 J608H

## (undated) DEVICE — BAG URINE 4 LITER 600909

## (undated) DEVICE — SUT ETHILON 2-0 FS 664H

## (undated) DEVICE — Device

## (undated) DEVICE — ARM DRAPE

## (undated) DEVICE — SUT VICRYL 3-0 SH J416H

## (undated) DEVICE — FLOSEAL WITH RECOTHROM - 5ML: Brand: FLOSEAL HEMOSTATIC MATRIX

## (undated) DEVICE — HF-RESECTION ELECTRODE PLASMALOOP LOOP, MEDIUM, 24 FR., 12°/16°, ESG TURIS: Brand: OLYMPUS

## (undated) DEVICE — COLUMN DRAPE

## (undated) DEVICE — 3M™ DURAPORE™ SURGICAL TAPE 1538-1, 1 INCH X 10 YARD (2,5CM X 9,1M), 12 ROLLS/BOX: Brand: 3M™ DURAPORE™

## (undated) DEVICE — SLEEVE KENDALL SCD EXPRESS MED

## (undated) DEVICE — EVACUATOR RELIAVAC 100CC

## (undated) DEVICE — LIGHT HANDLE

## (undated) DEVICE — DERMABOND CLOSURE 0.7ML TOPICL

## (undated) DEVICE — INSUFFLATION NEEDLE TO ESTABLISH PNEUMOPERITONEUM.: Brand: INSUFFLATION NEEDLE

## (undated) DEVICE — SUTURE VLOC 180 3-0 6" 0604

## (undated) DEVICE — #15 STERILE STAINLESS BLADE: Brand: STERILE STAINLESS BLADES

## (undated) DEVICE — STERILE SYNTHETIC NEOPRENE POWDER-FREE SURGICAL GLOVES WITH NITRILE COATING, SMOOTH FINISH, STRAIGHT FINGER: Brand: PROTEXIS

## (undated) DEVICE — SOLUTION  .9 3000ML

## (undated) DEVICE — TRAY SURESTEP 16 BARDEX UMETR

## (undated) DEVICE — CYSTO CDS-LF: Brand: MEDLINE INDUSTRIES, INC.

## (undated) DEVICE — 2, DISPOSABLE SUCTION/IRRIGATOR WITHOUT DISPOSABLE TIP: Brand: STRYKEFLOW

## (undated) DEVICE — DRAIN ROUND HUBLESS 15FR

## (undated) DEVICE — MEGADYNE ELECTRODE ADULT PT RT

## (undated) NOTE — LETTER
[facility]  Phone: [phone]  ANTIBIOTIC ALLERGY/SENSITIVITY/CONTRAINDICATION    Sent By:  Steven Zuleta RN Date: 23    Patient Name: Giorgi Sal  Surgery Date: 2023    CSN: [id number]  Medical Record: [id number]   : 1950 - A: 81 y      Sex: male  Surgeon(s):  Radha Hernandez MD  Procedure: ROBOTIC ASSISTED LEFT PARTIAL NEPHRECTOMY WITH INTRAOPERATIVE ULTRASOUND  Anesthesia Type: General    To Surgeon: Radha Hernandez MD Fax #: [phone]    ALLERGY TO PENICILLINS    The above patient has an allergy/sensitivity or contraindication to the antibiotic ordered from your standing orders/[facility] Pre-op Standing orders/[facility] Adult Preoperative Prophylactic Protocol listed below. If you would like the medication given, please fax this form back indicating the override reason with a physician signature/date/and time. ___  Benefit outweighs risk  ___  Insignificant   ___  Low risk    ___ Not a true allergy   ___  Dose appropriate   ___  Tolerated regimen in the past     If you prefer to order an alternate antibiotic please list drug, dose, route and time to administer below:     _____________________________________  _______    ___________   __________________  Antibiotic                                                                       Dose            Route                   Time of administration      ________________________________________Date____________Time________  (MD signature)  Fax this form back to [phone]  CONFIDENTIALITY NOTICE  This transmission is intended only for the use of the individual or entity to which it is addressed and may contain information that is privileged and confidential.  If the reader of this message is not the intended recipient, you are hereby notified that any disclosure, distribution, or copying of this information is strictly prohibited.   If you have received this transmission in error, please notify us immediately by telephone, and return the original documents to us at the address listed above.

## (undated) NOTE — LETTER
OUTSIDE TESTING RESULT REQUEST     IMPORTANT: FOR YOUR IMMEDIATE ATTENTION  Please FAX all test results listed below to: [phone]     Testing already done on or about: 23     * * * * If testing is NOT complete, arrange with patient A.S.A.P. * * * *      Patient Name: En Mchugh  Surgery Date: 2023  Medical Record: [id number]  CSN: [id number]  : 1950 - A: 81 y     Sex: male  Surgeon(s):  Cali Terrazas MD  Procedure: Reggie Lan, TRANSURETHRAL RESECTION PROSTATE  Anesthesia Type: General     Surgeon: Cali Terrazas MD     The following Testing and Time Line are REQUIRED PER ANESTHESIA     EKG READ AND SIGNED WITHIN   90 days  BMP (requires 4 hour fast) within  90 days      Thank Angela Muir by:Symone TONG

## (undated) NOTE — LETTER
[facility]  Phone: [phone]  OUTSIDE TESTING RESULT REQUEST      TO:   DR. Nohemy Flores  Today's Date: 23    FAX #: [phone]     IMPORTANT: FOR YOUR IMMEDIATE ATTENTION  Please FAX all test results listed below to: [phone]     Testing already done on or about: 7/3/2023     * * * * If testing is NOT complete, arrange with patient A.S.A.P. * * * *      Patient Name: Kristen Krause  Surgery Date: 2023    CSN: [id number]  Medical Record: [id number]   : 1950 - A: 81 y      Sex: male  Surgeon(s):  Karly Hudson MD  Procedure: ROBOTIC ASSISTED LEFT PARTIAL NEPHRECTOMY WITH INTRAOPERATIVE ULTRASOUND  Anesthesia Type: General     Surgeon: Karly Hudson MD       The following Testing and Time Line are REQUIRED PER ANESTHESIA     Chest X-Ray within 6 months  PT/INR within  30 days  PTT within  30 days  Urine C&S within  30 days      Thank You,   Sent by: Mercy King  This transmission is intended only for the use of the individual or entity to which it is addressed and may contain information that is privileged and confidential.  If the reader of this message is not the intended recipient, you are hereby notified that any disclosure, distribution, or copying of this information is strictly prohibited. If you have received this transmission in error, please notify us immediately by telephone, and return the original documents to us at the address listed above.